# Patient Record
Sex: MALE | Race: BLACK OR AFRICAN AMERICAN | URBAN - METROPOLITAN AREA
[De-identification: names, ages, dates, MRNs, and addresses within clinical notes are randomized per-mention and may not be internally consistent; named-entity substitution may affect disease eponyms.]

---

## 2020-11-17 ENCOUNTER — EMERGENCY (EMERGENCY)
Facility: HOSPITAL | Age: 35
LOS: 0 days | Discharge: HOME | End: 2020-11-17
Attending: EMERGENCY MEDICINE | Admitting: EMERGENCY MEDICINE
Payer: COMMERCIAL

## 2020-11-17 VITALS
WEIGHT: 289.91 LBS | OXYGEN SATURATION: 97 % | RESPIRATION RATE: 18 BRPM | DIASTOLIC BLOOD PRESSURE: 88 MMHG | HEIGHT: 75 IN | SYSTOLIC BLOOD PRESSURE: 134 MMHG | HEART RATE: 97 BPM | TEMPERATURE: 99 F

## 2020-11-17 VITALS — HEIGHT: 75 IN

## 2020-11-17 DIAGNOSIS — U07.1 COVID-19: ICD-10-CM

## 2020-11-17 DIAGNOSIS — R11.0 NAUSEA: ICD-10-CM

## 2020-11-17 DIAGNOSIS — R73.9 HYPERGLYCEMIA, UNSPECIFIED: ICD-10-CM

## 2020-11-17 DIAGNOSIS — Z87.891 PERSONAL HISTORY OF NICOTINE DEPENDENCE: ICD-10-CM

## 2020-11-17 LAB
ALBUMIN SERPL ELPH-MCNC: 4.3 G/DL — SIGNIFICANT CHANGE UP (ref 3.5–5.2)
ALP SERPL-CCNC: 66 U/L — SIGNIFICANT CHANGE UP (ref 30–115)
ALT FLD-CCNC: 41 U/L — SIGNIFICANT CHANGE UP (ref 0–41)
ANION GAP SERPL CALC-SCNC: 13 MMOL/L — SIGNIFICANT CHANGE UP (ref 7–14)
AST SERPL-CCNC: 38 U/L — SIGNIFICANT CHANGE UP (ref 0–41)
BASE EXCESS BLDV CALC-SCNC: 2.8 MMOL/L — HIGH (ref -2–2)
BASOPHILS # BLD AUTO: 0.01 K/UL — SIGNIFICANT CHANGE UP (ref 0–0.2)
BASOPHILS NFR BLD AUTO: 0.2 % — SIGNIFICANT CHANGE UP (ref 0–1)
BILIRUB SERPL-MCNC: 0.6 MG/DL — SIGNIFICANT CHANGE UP (ref 0.2–1.2)
BUN SERPL-MCNC: 12 MG/DL — SIGNIFICANT CHANGE UP (ref 10–20)
CA-I SERPL-SCNC: 1.06 MMOL/L — LOW (ref 1.12–1.3)
CALCIUM SERPL-MCNC: 8.8 MG/DL — SIGNIFICANT CHANGE UP (ref 8.5–10.1)
CHLORIDE SERPL-SCNC: 91 MMOL/L — LOW (ref 98–110)
CO2 SERPL-SCNC: 25 MMOL/L — SIGNIFICANT CHANGE UP (ref 17–32)
CREAT SERPL-MCNC: 1.1 MG/DL — SIGNIFICANT CHANGE UP (ref 0.7–1.5)
EOSINOPHIL # BLD AUTO: 0 K/UL — SIGNIFICANT CHANGE UP (ref 0–0.7)
EOSINOPHIL NFR BLD AUTO: 0 % — SIGNIFICANT CHANGE UP (ref 0–8)
GAS PNL BLDV: 134 MMOL/L — LOW (ref 136–145)
GAS PNL BLDV: SIGNIFICANT CHANGE UP
GLUCOSE SERPL-MCNC: 366 MG/DL — HIGH (ref 70–99)
HCO3 BLDV-SCNC: 29 MMOL/L — SIGNIFICANT CHANGE UP (ref 22–29)
HCT VFR BLD CALC: 42.4 % — SIGNIFICANT CHANGE UP (ref 42–52)
HCT VFR BLDA CALC: 41.8 % — SIGNIFICANT CHANGE UP (ref 34–44)
HGB BLD CALC-MCNC: 13.6 G/DL — LOW (ref 14–18)
HGB BLD-MCNC: 14.5 G/DL — SIGNIFICANT CHANGE UP (ref 14–18)
IMM GRANULOCYTES NFR BLD AUTO: 0.5 % — HIGH (ref 0.1–0.3)
LACTATE BLDV-MCNC: 1.7 MMOL/L — HIGH (ref 0.5–1.6)
LYMPHOCYTES # BLD AUTO: 1.16 K/UL — LOW (ref 1.2–3.4)
LYMPHOCYTES # BLD AUTO: 19.7 % — LOW (ref 20.5–51.1)
MCHC RBC-ENTMCNC: 27.6 PG — SIGNIFICANT CHANGE UP (ref 27–31)
MCHC RBC-ENTMCNC: 34.2 G/DL — SIGNIFICANT CHANGE UP (ref 32–37)
MCV RBC AUTO: 80.8 FL — SIGNIFICANT CHANGE UP (ref 80–94)
MONOCYTES # BLD AUTO: 0.22 K/UL — SIGNIFICANT CHANGE UP (ref 0.1–0.6)
MONOCYTES NFR BLD AUTO: 3.7 % — SIGNIFICANT CHANGE UP (ref 1.7–9.3)
NEUTROPHILS # BLD AUTO: 4.47 K/UL — SIGNIFICANT CHANGE UP (ref 1.4–6.5)
NEUTROPHILS NFR BLD AUTO: 75.9 % — HIGH (ref 42.2–75.2)
NRBC # BLD: 0 /100 WBCS — SIGNIFICANT CHANGE UP (ref 0–0)
PCO2 BLDV: 50 MMHG — SIGNIFICANT CHANGE UP (ref 41–51)
PH BLDV: 7.37 — SIGNIFICANT CHANGE UP (ref 7.26–7.43)
PLATELET # BLD AUTO: 196 K/UL — SIGNIFICANT CHANGE UP (ref 130–400)
PO2 BLDV: 22 MMHG — SIGNIFICANT CHANGE UP (ref 20–40)
POTASSIUM BLDV-SCNC: 4.2 MMOL/L — SIGNIFICANT CHANGE UP (ref 3.3–5.6)
POTASSIUM SERPL-MCNC: 4.8 MMOL/L — SIGNIFICANT CHANGE UP (ref 3.5–5)
POTASSIUM SERPL-SCNC: 4.8 MMOL/L — SIGNIFICANT CHANGE UP (ref 3.5–5)
PROT SERPL-MCNC: 6.8 G/DL — SIGNIFICANT CHANGE UP (ref 6–8)
RBC # BLD: 5.25 M/UL — SIGNIFICANT CHANGE UP (ref 4.7–6.1)
RBC # FLD: 11.9 % — SIGNIFICANT CHANGE UP (ref 11.5–14.5)
SAO2 % BLDV: 29 % — SIGNIFICANT CHANGE UP
SODIUM SERPL-SCNC: 129 MMOL/L — LOW (ref 135–146)
WBC # BLD: 5.89 K/UL — SIGNIFICANT CHANGE UP (ref 4.8–10.8)
WBC # FLD AUTO: 5.89 K/UL — SIGNIFICANT CHANGE UP (ref 4.8–10.8)

## 2020-11-17 PROCEDURE — 93010 ELECTROCARDIOGRAM REPORT: CPT

## 2020-11-17 PROCEDURE — 71045 X-RAY EXAM CHEST 1 VIEW: CPT | Mod: 26

## 2020-11-17 PROCEDURE — 99285 EMERGENCY DEPT VISIT HI MDM: CPT

## 2020-11-17 RX ORDER — METFORMIN HYDROCHLORIDE 850 MG/1
1 TABLET ORAL
Qty: 28 | Refills: 0
Start: 2020-11-17 | End: 2020-11-30

## 2020-11-17 RX ORDER — SODIUM CHLORIDE 9 MG/ML
1000 INJECTION INTRAMUSCULAR; INTRAVENOUS; SUBCUTANEOUS ONCE
Refills: 0 | Status: COMPLETED | OUTPATIENT
Start: 2020-11-17 | End: 2020-11-17

## 2020-11-17 RX ADMIN — SODIUM CHLORIDE 1000 MILLILITER(S): 9 INJECTION INTRAMUSCULAR; INTRAVENOUS; SUBCUTANEOUS at 19:00

## 2020-11-17 NOTE — ED PROVIDER NOTE - OBJECTIVE STATEMENT
35 year old M no pmhx, recently tested covid + x 1 week ago presenting for eval. Pt sts for the last 10 days has had gen weakness, fatigue, cough, sob, decreased appetite, loss of smell. Sts wife also tested covid +. Pt developed nausea/ 3 episodes of nbnb vomiting today. No chest pain, abd pain, urinary symptoms, diarrhea, leg pain/swelling, recent travel.

## 2020-11-17 NOTE — ED ADULT NURSE NOTE - NSIMPLEMENTINTERV_GEN_ALL_ED
Implemented All Universal Safety Interventions:  [place] to call system. Call bell, personal items and telephone within reach. Instruct patient to call for assistance. Room bathroom lighting operational. Non-slip footwear when patient is off stretcher. Physically safe environment: no spills, clutter or unnecessary equipment. Stretcher in lowest position, wheels locked, appropriate side rails in place.

## 2020-11-17 NOTE — ED PROVIDER NOTE - PATIENT PORTAL LINK FT
You can access the FollowMyHealth Patient Portal offered by [facility] by registering at the following website: [email or web address]. By joining [facility]’s FollowMyHealth portal, you will also be able to view your health information using other applications (apps) compatible with our system.

## 2020-11-17 NOTE — ED ADULT TRIAGE NOTE - CHIEF COMPLAINT QUOTE
patient tested positive for covid 11/10. patient presents to the er complaining of weakness, nausea and vomiting, had 2-3 episodes of nonbloody emesis.

## 2020-11-17 NOTE — ED PROVIDER NOTE - PROGRESS NOTE DETAILS
Glucose is elevated, patient does not have a h/o DM, blood gas was ordered. Patient appears well, oxygen saturation WNL, nml work od breathing.  No acidosis on blood gas.  Test results were d/w the patient, he now admitted that he had DM in the past, used to take Metformin and Glipizide then lost weight, changed his diet and was taken off his meds.  Will prescribe Metformin again, patient was advised to follow up with his PMD as soon as he recovers from COVID.  Advised to return to ER immediately  if any worsening symptoms.  Patient to be discharged from ED. Any available test results were discussed with patient and/or family. Verbal instructions given, including instructions to return to ED immediately for any new, worsening, or concerning symptoms. Patient endorsed understanding. Written discharge instructions additionally given, including follow-up plan.  Patient was given opportunity to ask questions.

## 2020-11-17 NOTE — ED ADULT NURSE NOTE - CHIEF COMPLAINT QUOTE
patient tested positive for covid 1110. patient presents to the er complaining of weakness, nausea and vomiting, had 2-3 episodes of nonbloody emesis.

## 2020-11-17 NOTE — ED PROVIDER NOTE - NS ED ROS FT
Constitutional: + chills, gen weakness, fatigue  Eyes: no redness/discharge/pain/vision changes  ENT: no rhinorrhea/ear pain/sore throat  Cardiac: No chest pain  Respiratory: + cough/sob. No respiratory distress  GI: + n/v. No  diarrhea or abdominal pain.  : No dysuria, frequency, urgency or hematuria  MS: no pain to back or extremities, no loss of ROM, no weakness  Neuro: No headache or weakness. No LOC.  Skin: No skin rash.  Endocrine: No history of thyroid disease or diabetes.  Except as documented in the HPI, all other systems are negative.

## 2020-11-17 NOTE — ED PROVIDER NOTE - CARE PROVIDER_API CALL
Margaret Will  INTERNAL MEDICINE  [address] [place] [id number]  Phone: [phone]  Fax: [phone]  Follow Up Time:

## 2020-11-17 NOTE — ED PROVIDER NOTE - CLINICAL SUMMARY MEDICAL DECISION MAKING FREE TEXT BOX
Patient appears well, oxygen saturation WNL, nml work od breathing.  No acidosis on blood gas.  Test results were d/w the patient, he now admitted that he had DM in the past, used to take Metformin and Glipizide then lost weight, changed his diet and was taken off his meds.  Will prescribe Metformin again, patient was advised to follow up with his PMD as soon as he recovers from COVID.  Advised to return to ER immediately  if any worsening symptoms.

## 2020-11-17 NOTE — ED PROVIDER NOTE - CARE PROVIDERS DIRECT ADDRESSES
[email or web address] [address].[email or web address] Islandirect.[facility]

## 2020-11-17 NOTE — ED PROVIDER NOTE - ATTENDING CONTRIBUTION TO CARE
36 yo male without any significant PMH, tested positive for COVID -19 last week, symptomatic for past 10 days c/o vomiting which developed today, associated with poor appetite, diminished sense of smell, non-productive cough and feeling of SOB, generalized fatigue.  + COVID contacts at home.  No diarrhea or abdominal pain. no leg pain or swelling.   Well-appearing well-nourished, NAD, head AT/NC, PERRL, pink conjunctivae, nml phonation, supple neck without midline spine ttp, nml work of breathing,  speaking full sentences, lungs CTA b/l, equal air entry, RRR, well-perfused extremities, distal pulses intact, abdomen soft, NT/ND, BS present in all quadrants, no midline spine or CVA ttp, no leg edema or unilateral calf swelling, A&Ox3, no focal neuro deficits, nml mood and affect.  He is carrying a conversation while ambulating , pulse ox did not drop below 96 % on RA during activity and he did not report worsening of shortness of breath.  Will give fluids, get CXR and ECG, likely d/c home.  Patient has a pulse ox at home already.

## 2020-11-17 NOTE — ED PROVIDER NOTE - NSTIMEPROVIDERCAREINITIATE_GEN_ER
Pt is given results of thyroid US.  Per Dr Lema, nodules are common, not suspicious for cancer.  Pt should have repeat US in one year.   17-Nov-2020 18:08

## 2021-09-01 PROBLEM — Z00.00 ENCOUNTER FOR PREVENTIVE HEALTH EXAMINATION: Status: ACTIVE | Noted: 2021-09-01

## 2021-09-07 DIAGNOSIS — E66.01 MORBID (SEVERE) OBESITY DUE TO EXCESS CALORIES: ICD-10-CM

## 2021-09-09 ENCOUNTER — APPOINTMENT (OUTPATIENT)
Dept: SURGERY | Facility: CLINIC | Age: 36
End: 2021-09-09

## 2021-10-13 ENCOUNTER — APPOINTMENT (OUTPATIENT)
Age: 36
End: 2021-10-13
Payer: COMMERCIAL

## 2021-10-13 VITALS
WEIGHT: 301 LBS | HEART RATE: 80 BPM | DIASTOLIC BLOOD PRESSURE: 82 MMHG | SYSTOLIC BLOOD PRESSURE: 150 MMHG | BODY MASS INDEX: 36.65 KG/M2 | OXYGEN SATURATION: 98 % | HEIGHT: 76 IN

## 2021-10-13 PROCEDURE — 99202 OFFICE O/P NEW SF 15 MIN: CPT

## 2021-10-13 NOTE — HISTORY OF PRESENT ILLNESS
[Initial Evaluation] : an initial evaluation of [Snoring] : snoring [Unrefreshing Sleep] : unrefreshing sleep [Currently Experiencing] : The patient is currently experiencing symptoms. [None] : No associated symptoms are reported

## 2021-11-06 ENCOUNTER — OUTPATIENT (OUTPATIENT)
Dept: OUTPATIENT SERVICES | Facility: HOSPITAL | Age: 36
LOS: 1 days | Discharge: HOME | End: 2021-11-06
Payer: COMMERCIAL

## 2021-11-06 PROCEDURE — 95810 POLYSOM 6/> YRS 4/> PARAM: CPT | Mod: 26

## 2021-11-08 DIAGNOSIS — G47.33 OBSTRUCTIVE SLEEP APNEA (ADULT) (PEDIATRIC): ICD-10-CM

## 2021-11-21 ENCOUNTER — LABORATORY RESULT (OUTPATIENT)
Age: 36
End: 2021-11-21

## 2021-11-24 ENCOUNTER — OUTPATIENT (OUTPATIENT)
Dept: OUTPATIENT SERVICES | Facility: HOSPITAL | Age: 36
LOS: 1 days | Discharge: HOME | End: 2021-11-24
Payer: COMMERCIAL

## 2021-11-24 DIAGNOSIS — R06.02 SHORTNESS OF BREATH: ICD-10-CM

## 2021-11-24 PROCEDURE — 94729 DIFFUSING CAPACITY: CPT | Mod: 26

## 2021-11-24 PROCEDURE — 94060 EVALUATION OF WHEEZING: CPT | Mod: 26

## 2021-11-24 PROCEDURE — 94727 GAS DIL/WSHOT DETER LNG VOL: CPT | Mod: 26

## 2021-11-29 ENCOUNTER — APPOINTMENT (OUTPATIENT)
Age: 36
End: 2021-11-29
Payer: COMMERCIAL

## 2021-11-29 VITALS
OXYGEN SATURATION: 95 % | WEIGHT: 296 LBS | DIASTOLIC BLOOD PRESSURE: 88 MMHG | BODY MASS INDEX: 36.04 KG/M2 | HEIGHT: 76 IN | HEART RATE: 115 BPM | RESPIRATION RATE: 14 BRPM | SYSTOLIC BLOOD PRESSURE: 140 MMHG

## 2021-11-29 PROCEDURE — 99214 OFFICE O/P EST MOD 30 MIN: CPT | Mod: 25

## 2021-11-29 PROCEDURE — 71046 X-RAY EXAM CHEST 2 VIEWS: CPT

## 2021-11-29 NOTE — DISCUSSION/SUMMARY
[FreeTextEntry1] : CXR PA and Lateral \par The costophrenic and cardiophrenic angles are sharp\par The srikanth parenchyma shows no infiltrates, consolidations, or nodules \par The Mediastinum is within normal limits\par No pleural effusions\par

## 2021-11-29 NOTE — HISTORY OF PRESENT ILLNESS
[Follow-Up - Routine Clinic] : a routine clinic follow-up of [Snoring] : snoring [Unrefreshing Sleep] : unrefreshing sleep [Currently Experiencing] : The patient is currently experiencing symptoms. [None] : No associated symptoms are reported

## 2022-03-10 ENCOUNTER — APPOINTMENT (OUTPATIENT)
Age: 37
End: 2022-03-10
Payer: COMMERCIAL

## 2022-03-10 VITALS
WEIGHT: 297 LBS | RESPIRATION RATE: 14 BRPM | OXYGEN SATURATION: 97 % | SYSTOLIC BLOOD PRESSURE: 146 MMHG | HEART RATE: 112 BPM | HEIGHT: 76 IN | BODY MASS INDEX: 36.17 KG/M2 | DIASTOLIC BLOOD PRESSURE: 86 MMHG

## 2022-03-10 DIAGNOSIS — G47.33 OBSTRUCTIVE SLEEP APNEA (ADULT) (PEDIATRIC): ICD-10-CM

## 2022-03-10 PROCEDURE — 99213 OFFICE O/P EST LOW 20 MIN: CPT

## 2022-03-10 NOTE — ASSESSMENT
[FreeTextEntry1] : MOderate DALE on APAP \par At the present time from the pulmonary stand point, the patient is stable for his bariatric surgery

## 2022-03-10 NOTE — HISTORY OF PRESENT ILLNESS
[Follow-Up - Routine Clinic] : a routine clinic follow-up of [None] : No associated symptoms are reported [Good Compliance] : good compliance with treatment [Good Tolerance] : good tolerance of treatment [Good Symptom Control] : good symptom control [de-identified] : APAP

## 2022-10-03 ENCOUNTER — APPOINTMENT (OUTPATIENT)
Age: 37
End: 2022-10-03

## 2024-03-27 ENCOUNTER — INPATIENT (INPATIENT)
Facility: HOSPITAL | Age: 39
LOS: 0 days | Discharge: HOME | DRG: 299 | End: 2024-03-28
Attending: INTERNAL MEDICINE | Admitting: STUDENT IN AN ORGANIZED HEALTH CARE EDUCATION/TRAINING PROGRAM
Payer: COMMERCIAL

## 2024-03-27 VITALS
RESPIRATION RATE: 17 BRPM | HEART RATE: 82 BPM | TEMPERATURE: 98 F | HEIGHT: 77 IN | OXYGEN SATURATION: 100 % | DIASTOLIC BLOOD PRESSURE: 89 MMHG | SYSTOLIC BLOOD PRESSURE: 168 MMHG | WEIGHT: 250 LBS

## 2024-03-27 DIAGNOSIS — I82.409 ACUTE EMBOLISM AND THROMBOSIS OF UNSPECIFIED DEEP VEINS OF UNSPECIFIED LOWER EXTREMITY: ICD-10-CM

## 2024-03-27 DIAGNOSIS — Z98.890 OTHER SPECIFIED POSTPROCEDURAL STATES: Chronic | ICD-10-CM

## 2024-03-27 DIAGNOSIS — I26.99 OTHER PULMONARY EMBOLISM WITHOUT ACUTE COR PULMONALE: ICD-10-CM

## 2024-03-27 LAB
ALBUMIN SERPL ELPH-MCNC: 4.6 G/DL — SIGNIFICANT CHANGE UP (ref 3.5–5.2)
ALP SERPL-CCNC: 112 U/L — SIGNIFICANT CHANGE UP (ref 30–115)
ALT FLD-CCNC: 21 U/L — SIGNIFICANT CHANGE UP (ref 0–41)
ANION GAP SERPL CALC-SCNC: 14 MMOL/L — SIGNIFICANT CHANGE UP (ref 7–14)
APTT BLD: 35.8 SEC — SIGNIFICANT CHANGE UP (ref 27–39.2)
AST SERPL-CCNC: 16 U/L — SIGNIFICANT CHANGE UP (ref 0–41)
BASOPHILS # BLD AUTO: 0.07 K/UL — SIGNIFICANT CHANGE UP (ref 0–0.2)
BASOPHILS NFR BLD AUTO: 0.9 % — SIGNIFICANT CHANGE UP (ref 0–1)
BILIRUB SERPL-MCNC: 0.5 MG/DL — SIGNIFICANT CHANGE UP (ref 0.2–1.2)
BUN SERPL-MCNC: 15 MG/DL — SIGNIFICANT CHANGE UP (ref 10–20)
CALCIUM SERPL-MCNC: 10.1 MG/DL — SIGNIFICANT CHANGE UP (ref 8.4–10.5)
CHLORIDE SERPL-SCNC: 94 MMOL/L — LOW (ref 98–110)
CHOLEST SERPL-MCNC: 189 MG/DL — SIGNIFICANT CHANGE UP
CO2 SERPL-SCNC: 25 MMOL/L — SIGNIFICANT CHANGE UP (ref 17–32)
CREAT SERPL-MCNC: 0.9 MG/DL — SIGNIFICANT CHANGE UP (ref 0.7–1.5)
EGFR: 111 ML/MIN/1.73M2 — SIGNIFICANT CHANGE UP
EOSINOPHIL # BLD AUTO: 0.11 K/UL — SIGNIFICANT CHANGE UP (ref 0–0.7)
EOSINOPHIL NFR BLD AUTO: 1.3 % — SIGNIFICANT CHANGE UP (ref 0–8)
GLUCOSE SERPL-MCNC: 293 MG/DL — HIGH (ref 70–99)
HCT VFR BLD CALC: 39.8 % — LOW (ref 42–52)
HDLC SERPL-MCNC: 35 MG/DL — LOW
HGB BLD-MCNC: 13.4 G/DL — LOW (ref 14–18)
IMM GRANULOCYTES NFR BLD AUTO: 0.5 % — HIGH (ref 0.1–0.3)
INR BLD: 0.97 RATIO — SIGNIFICANT CHANGE UP (ref 0.65–1.3)
LACTATE SERPL-SCNC: 2.4 MMOL/L — HIGH (ref 0.7–2)
LACTATE SERPL-SCNC: 3.1 MMOL/L — HIGH (ref 0.7–2)
LYMPHOCYTES # BLD AUTO: 2.1 K/UL — SIGNIFICANT CHANGE UP (ref 1.2–3.4)
LYMPHOCYTES # BLD AUTO: 25.5 % — SIGNIFICANT CHANGE UP (ref 20.5–51.1)
MCHC RBC-ENTMCNC: 25 PG — LOW (ref 27–31)
MCHC RBC-ENTMCNC: 33.7 G/DL — SIGNIFICANT CHANGE UP (ref 32–37)
MCV RBC AUTO: 74.3 FL — LOW (ref 80–94)
MONOCYTES # BLD AUTO: 0.49 K/UL — SIGNIFICANT CHANGE UP (ref 0.1–0.6)
MONOCYTES NFR BLD AUTO: 6 % — SIGNIFICANT CHANGE UP (ref 1.7–9.3)
NEUTROPHILS # BLD AUTO: 5.41 K/UL — SIGNIFICANT CHANGE UP (ref 1.4–6.5)
NEUTROPHILS NFR BLD AUTO: 65.8 % — SIGNIFICANT CHANGE UP (ref 42.2–75.2)
NON HDL CHOLESTEROL: 154 MG/DL — HIGH
NRBC # BLD: 0 /100 WBCS — SIGNIFICANT CHANGE UP (ref 0–0)
NT-PROBNP SERPL-SCNC: 59 PG/ML — SIGNIFICANT CHANGE UP (ref 0–300)
PLATELET # BLD AUTO: 329 K/UL — SIGNIFICANT CHANGE UP (ref 130–400)
PMV BLD: 9.1 FL — SIGNIFICANT CHANGE UP (ref 7.4–10.4)
POTASSIUM SERPL-MCNC: 4.3 MMOL/L — SIGNIFICANT CHANGE UP (ref 3.5–5)
POTASSIUM SERPL-SCNC: 4.3 MMOL/L — SIGNIFICANT CHANGE UP (ref 3.5–5)
PROT SERPL-MCNC: 7.7 G/DL — SIGNIFICANT CHANGE UP (ref 6–8)
PROTHROM AB SERPL-ACNC: 11.1 SEC — SIGNIFICANT CHANGE UP (ref 9.95–12.87)
RBC # BLD: 5.36 M/UL — SIGNIFICANT CHANGE UP (ref 4.7–6.1)
RBC # FLD: 12.9 % — SIGNIFICANT CHANGE UP (ref 11.5–14.5)
SODIUM SERPL-SCNC: 133 MMOL/L — LOW (ref 135–146)
TRIGL SERPL-MCNC: 458 MG/DL — HIGH
TROPONIN T, HIGH SENSITIVITY RESULT: 12 NG/L — SIGNIFICANT CHANGE UP (ref 6–21)
TROPONIN T, HIGH SENSITIVITY RESULT: 14 NG/L — SIGNIFICANT CHANGE UP (ref 6–21)
WBC # BLD: 8.22 K/UL — SIGNIFICANT CHANGE UP (ref 4.8–10.8)
WBC # FLD AUTO: 8.22 K/UL — SIGNIFICANT CHANGE UP (ref 4.8–10.8)

## 2024-03-27 PROCEDURE — 99223 1ST HOSP IP/OBS HIGH 75: CPT

## 2024-03-27 PROCEDURE — 93306 TTE W/DOPPLER COMPLETE: CPT

## 2024-03-27 PROCEDURE — 85027 COMPLETE CBC AUTOMATED: CPT

## 2024-03-27 PROCEDURE — 80061 LIPID PANEL: CPT

## 2024-03-27 PROCEDURE — 86901 BLOOD TYPING SEROLOGIC RH(D): CPT

## 2024-03-27 PROCEDURE — 86900 BLOOD TYPING SEROLOGIC ABO: CPT

## 2024-03-27 PROCEDURE — 36415 COLL VENOUS BLD VENIPUNCTURE: CPT

## 2024-03-27 PROCEDURE — 83605 ASSAY OF LACTIC ACID: CPT

## 2024-03-27 PROCEDURE — 99285 EMERGENCY DEPT VISIT HI MDM: CPT

## 2024-03-27 PROCEDURE — 86850 RBC ANTIBODY SCREEN: CPT

## 2024-03-27 PROCEDURE — 83880 ASSAY OF NATRIURETIC PEPTIDE: CPT

## 2024-03-27 PROCEDURE — 93010 ELECTROCARDIOGRAM REPORT: CPT

## 2024-03-27 PROCEDURE — 80053 COMPREHEN METABOLIC PANEL: CPT

## 2024-03-27 PROCEDURE — 84484 ASSAY OF TROPONIN QUANT: CPT

## 2024-03-27 PROCEDURE — 83721 ASSAY OF BLOOD LIPOPROTEIN: CPT

## 2024-03-27 PROCEDURE — 93970 EXTREMITY STUDY: CPT | Mod: 26

## 2024-03-27 PROCEDURE — 83036 HEMOGLOBIN GLYCOSYLATED A1C: CPT

## 2024-03-27 PROCEDURE — 71275 CT ANGIOGRAPHY CHEST: CPT | Mod: 26,MC

## 2024-03-27 PROCEDURE — 83735 ASSAY OF MAGNESIUM: CPT

## 2024-03-27 PROCEDURE — 84443 ASSAY THYROID STIM HORMONE: CPT

## 2024-03-27 RX ORDER — MORPHINE SULFATE 50 MG/1
2 CAPSULE, EXTENDED RELEASE ORAL ONCE
Refills: 0 | Status: DISCONTINUED | OUTPATIENT
Start: 2024-03-27 | End: 2024-03-27

## 2024-03-27 RX ORDER — ENOXAPARIN SODIUM 100 MG/ML
110 INJECTION SUBCUTANEOUS ONCE
Refills: 0 | Status: COMPLETED | OUTPATIENT
Start: 2024-03-27 | End: 2024-03-27

## 2024-03-27 RX ORDER — APIXABAN 2.5 MG/1
10 TABLET, FILM COATED ORAL EVERY 12 HOURS
Refills: 0 | Status: DISCONTINUED | OUTPATIENT
Start: 2024-03-27 | End: 2024-03-27

## 2024-03-27 RX ORDER — APIXABAN 2.5 MG/1
10 TABLET, FILM COATED ORAL EVERY 12 HOURS
Refills: 0 | Status: DISCONTINUED | OUTPATIENT
Start: 2024-03-27 | End: 2024-03-28

## 2024-03-27 RX ORDER — SODIUM CHLORIDE 9 MG/ML
1000 INJECTION, SOLUTION INTRAVENOUS ONCE
Refills: 0 | Status: COMPLETED | OUTPATIENT
Start: 2024-03-27 | End: 2024-03-27

## 2024-03-27 RX ADMIN — SODIUM CHLORIDE 1000 MILLILITER(S): 9 INJECTION, SOLUTION INTRAVENOUS at 19:55

## 2024-03-27 RX ADMIN — APIXABAN 10 MILLIGRAM(S): 2.5 TABLET, FILM COATED ORAL at 21:41

## 2024-03-27 RX ADMIN — ENOXAPARIN SODIUM 110 MILLIGRAM(S): 100 INJECTION SUBCUTANEOUS at 15:43

## 2024-03-27 NOTE — H&P ADULT - NSHPREVIEWOFSYSTEMS_GEN_ALL_CORE
CONSTITUTIONAL: Well-developed; well-nourished; in no acute distress.   SKIN: warm, dry  HEAD: Normocephalic; atraumatic.  EYES: PERRL, EOMI, no conjunctival erythema  ENT: No nasal discharge; airway clear.  NECK: Supple; non tender.  CARD: S1, S2 normal; no murmurs, gallops, or rubs. Regular rate and rhythm.   RESP: No wheezes, rales or rhonchi.  ABD: soft ntnd  EXT: Normal ROM. LLE is edematous >RLE. DP pulse 2+ to the LEs. tenderness to the left calf- no bony tenderness.   NEURO: Alert, oriented, grossly unremarkable.  PSYCH: Cooperative, appropriate.

## 2024-03-27 NOTE — H&P ADULT - NSHPPHYSICALEXAM_GEN_ALL_CORE
General: NAD, Lying in bed comfortably  Neuro: AAOx3  HEENT: PERRL, EOMI  Cardio: Regular rate   Resp: Breathing comfortably on room air, no signs of respiratory distress; equal chest rise bilaterally   GI/Abd: Soft, NT/ND, no rebound/guarding.  Vascular: +TTP in the left calf, mild swelling of LLE; otherwise normal exam   Skin: Intact, no breakdown  Musculoskeletal: All 4 extremities moving spontaneously, no limitations.

## 2024-03-27 NOTE — CONSULT NOTE ADULT - ASSESSMENT
38M w/ PMHx of DM, x2 herniated discs s/p back surgery in 9/2023, here for LLE calf pain. Venous duplex reporting extensive DVT of the LLE.     #Extensive DVT of left lower extremity (femoral, popliteal, gastrocnemius, posterior tibial, peroneal, anterior tibial v.)   - No acute vascular intervention indicated   - Give shot of Lovenox in ED   - D/C home w/ Eliquis 10mg BID x 7 days, then Eliquis 5mg BID   - F/u outpatient with Dr. Chacon in 1 week   - Needs LLE compression dressings w/ Ace wrap, advised patient to start wearing compression stockings     Plan discussed with attending, Dr. Chacon   ------------------------------  Vascular Surgery  [phone]  38M w/ PMHx of DM, x2 herniated discs s/p back surgery in 9/2023, here for LLE calf pain. Venous duplex reporting extensive DVT of the LLE.     #Extensive DVT of left lower extremity (femoral, popliteal, gastrocnemius, posterior tibial, peroneal, anterior tibial v.)   - No acute vascular intervention indicated   - Give shot of therapeutic Lovenox in ED   - D/C home w/ Eliquis 10mg BID x 7 days, then Eliquis 5mg BID   - F/u outpatient with Dr. Chacon in 1 week   - Needs LLE compression dressings w/ Ace wrap, advised patient to start wearing compression stockings     Plan discussed with attending, Dr. Chacon   ------------------------------  Vascular Surgery  [phone]

## 2024-03-27 NOTE — ED ADULT NURSE NOTE - NSFALLUNIVINTERV_ED_ALL_ED
Bed/Stretcher in lowest position, wheels locked, appropriate side rails in place/Call bell, personal items and telephone in reach/Instruct patient to call for assistance before getting out of bed/chair/stretcher/Non-slip footwear applied when patient is off stretcher/[place] to call system/Physically safe environment - no spills, clutter or unnecessary equipment/Purposeful proactive rounding/Room/bathroom lighting operational, light cord in reach

## 2024-03-27 NOTE — CONSULT NOTE ADULT - SUBJECTIVE AND OBJECTIVE BOX
Patient is a 38y old  Male who presents with a chief complaint of LLE calf pain.     HPI: 38M w/ PMHx of DM, recent back surgery for x2 herniated discs in 9/2023, who presents to [facility] with his wife complaining of LLE pain. According to the patient, he began experiencing severe calf pain about 2 weeks ago. He went to his pain management doctor who advised the patient to come to ED for a duplex scan. 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 Denies fevers/chills, denies lightheadedness/dizziness, denies SOB/chest pain, denies nausea/vomiting, denies constipation/diarrhea.      ROS: 10-system review is otherwise negative except HPI above.      PAST MEDICAL & SURGICAL HISTORY:  ·	History of back surgery  ·	Diabetes Mellitus      FAMILY HISTORY:     SOCIAL HISTORY:  non-smoker     ALLERGIES: No Known Allergies    HOME MEDICATIONS:      CURRENT MEDICATIONS  MEDICATIONS (STANDING):   MEDICATIONS (PRN):  --------------------------------------------------------------------------------------------  VITALS:   T(C): 36.8 (03-27-24 @ 10:17), Max: 36.8 (03-27-24 @ 10:17)  HR: 82 (03-27-24 @ 10:17) (82 - 82)  BP: 168/89 (03-27-24 @ 10:17) (168/89 - 168/89)  RR: 17 (03-27-24 @ 10:17) (17 - 17)  SpO2: 100% (03-27-24 @ 10:17) (100% - 100%)    Height (cm): 195.6 (03-27 @ 10:17)  Weight (kg): 113.4 (03-27 @ 10:17)  BMI (kg/m2): 29.6 (03-27 @ 10:17)  BSA (m2): 2.46 (03-27 @ 10:17)    PHYSICAL EXAM:   General: NAD, Lying in bed comfortably  Neuro: AAOx3  HEENT: PERRL, EOMI  Cardio: Regular rate   Resp: Breathing comfortably on room air, no signs of respiratory distress; equal chest rise bilaterally   GI/Abd: Soft, NT/ND, no rebound/guarding.  Vascular: +TTP in the left calf, mild swelling of LLE; otherwise normal exam   Skin: Intact, no breakdown  Musculoskeletal: All 4 extremities moving spontaneously, no limitations.   --------------------------------------------------------------------------------------------  LABS    --------------------------------------------------------------------------------------------  MICROBIOLOGY    --------------------------------------------------------------------------------------------  IMAGING  --> VA Duplex Lower Ext Vein Scan, Bilat (03.27.24 @ 11:15) >  IMPRESSION:  Right lower extremity negative for DVT.    DVT in the left femoral, popliteal, gastrocnemius, posterior tibial,   peroneal, and anterior tibial veins.    < end of copied text >   Patient is a 38y old  Male who presents with a chief complaint of LLE calf pain.     HPI: 38M w/ PMHx of DM, recent back surgery for x2 herniated discs in 9/2023, who presents to [facility] with his wife complaining of LLE pain. According to the patient, he began experiencing severe calf pain about 2 weeks ago. He went to his pain management doctor who advised the patient to come to ED for a duplex scan. 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 Denies fevers/chills, denies lightheadedness/dizziness, denies SOB/chest pain, denies nausea/vomiting, denies constipation/diarrhea.      ROS: 10-system review is otherwise negative except HPI above.      PAST MEDICAL & SURGICAL HISTORY:  ·	History of back surgery  ·	Diabetes Mellitus    FAMILY HISTORY:     SOCIAL HISTORY:  non-smoker     ALLERGIES: No Known Allergies    HOME MEDICATIONS:      CURRENT MEDICATIONS  MEDICATIONS (STANDING):   MEDICATIONS (PRN):  --------------------------------------------------------------------------------------------  VITALS:   T(C): 36.8 (03-27-24 @ 10:17), Max: 36.8 (03-27-24 @ 10:17)  HR: 82 (03-27-24 @ 10:17) (82 - 82)  BP: 168/89 (03-27-24 @ 10:17) (168/89 - 168/89)  RR: 17 (03-27-24 @ 10:17) (17 - 17)  SpO2: 100% (03-27-24 @ 10:17) (100% - 100%)    Height (cm): 195.6 (03-27 @ 10:17)  Weight (kg): 113.4 (03-27 @ 10:17)  BMI (kg/m2): 29.6 (03-27 @ 10:17)  BSA (m2): 2.46 (03-27 @ 10:17)    PHYSICAL EXAM:   General: NAD, Lying in bed comfortably  Neuro: AAOx3  HEENT: PERRL, EOMI  Cardio: Regular rate   Resp: Breathing comfortably on room air, no signs of respiratory distress; equal chest rise bilaterally   GI/Abd: Soft, NT/ND, no rebound/guarding.  Vascular: +TTP in the left calf, mild swelling of LLE; otherwise normal exam   Skin: Intact, no breakdown  Musculoskeletal: All 4 extremities moving spontaneously, no limitations.   --------------------------------------------------------------------------------------------  LABS    --------------------------------------------------------------------------------------------  MICROBIOLOGY    --------------------------------------------------------------------------------------------  IMAGING  --> VA Duplex Lower Ext Vein Scan, Bilat (03.27.24 @ 11:15) >  IMPRESSION:  Right lower extremity negative for DVT.    DVT in the left femoral, popliteal, gastrocnemius, posterior tibial,   peroneal, and anterior tibial veins.    < end of copied text >

## 2024-03-27 NOTE — H&P ADULT - NSHPLABSRESULTS_GEN_ALL_CORE
The patient has been notified via their active [facility] account.     13.4   8.22  )-----------( [phone]      ( 27 Mar 2024 11:38 )             39.8     03-27    133<L>  |  94<L>  |  15  ----------------------------<  293<H>  4.3   |  25  |  0.9    Ca    10.1      27 Mar 2024 11:38    TPro  7.7  /  Alb  4.6  /  TBili  0.5  /  DBili  x   /  AST  16  /  ALT  21  /  AlkPhos  112  03-27    PT/INR - ( 27 Mar 2024 11:38 )   PT: 11.10 sec;   INR: 0.97 ratio         PTT - ( 27 Mar 2024 11:38 )  PTT:35.8 sec  Urinalysis Basic - ( 27 Mar 2024 11:38 )    Color: x / Appearance: x / SG: x / pH: x  Gluc: 293 mg/dL / Ketone: x  / Bili: x / Urobili: x   Blood: x / Protein: x / Nitrite: x   Leuk Esterase: x / RBC: x / WBC x   Sq Epi: x / Non Sq Epi: x / Bacteria: x        Lactate, Blood: 2.4 mmol/L *H* (03-27-24 @ 15:49)

## 2024-03-27 NOTE — ED PROVIDER NOTE - CLINICAL SUMMARY MEDICAL DECISION MAKING FREE TEXT BOX
38-year-old male with history of fusion surgery in September following with pain management, diabetes presenting today for displaceable lower extremity.  Patient states he has had left lower extremity swelling and pain for the last 2 weeks. Labs unremarkable. extensive DVT on US, vascular surgery consult appreciated. Patient noted with tachycardia, underwent CT angio chest with PE. Started on anticoagulation. admitted for further management.

## 2024-03-27 NOTE — H&P ADULT - HISTORY OF PRESENT ILLNESS
38M w/ PMHx of DM, recent back surgery for x2 herniated discs in 2023, who presents to [facility] with his wife complaining of LLE pain. According to the patient, he began experiencing severe calf pain about 2 weeks ago. He went to his pain management doctor who advised the patient to come to ED for a duplex scan. 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 Denies fevers/chills, denies lightheadedness/dizziness, denies SOB/chest pain, denies nausea/vomiting, denies constipation/diarrhea.      In the ER:   VS: /89, HR 82, RR 17, T 36.8 oral, Spo2 100% RA  Sig Labs: Trop 12, Lactate 2.1, BNP 59    EKG: Sinus rhythm with short MT Inferior infarct , age undetermined  Imagin) Duplex LE --> Right lower extremity negative for DVT. DVT in the left femoral, popliteal, gastrocnemius, posterior tibial, peroneal, and anterior tibial veins.  2) CTA Chest -->  Findings positive for pulmonary emboli within the segmental left lower arterial branch and subsegmental left upper lobe arterial branch.    Interventions in the ER: Lovenox 110

## 2024-03-27 NOTE — ED PROVIDER NOTE - OBJECTIVE STATEMENT
38-year-old male history of back surgery in September, diabetes complaining of left calf pain and swelling x 2 weeks.  He went to his pain management doctor  who noticed that his leg was swollen so sent him for duplex to rule out DVT.  Patient denies any recent travel

## 2024-03-27 NOTE — ED PROVIDER NOTE - PROGRESS NOTE DETAILS
vascular tech Laly--> Dr Ballesteros, + extensive dvt  to left leg  Dr Ballesteros --> vascular surgery, will come see patient DC: vas saw patient, recommends dose of lovenox in ED and eliquis if discharged. given how extensive clot is, will perform CT angio to r/o embolism. patient is stable. +PE on CT. Will admit. FELICIA [place] will f/u pending lab results

## 2024-03-27 NOTE — H&P ADULT - ATTENDING COMMENTS
38M w/ PMHx of DM, x2 herniated discs s/p back surgery in 9/2023, here for LLE calf pain. Venous duplex reporting extensive DVT of the LLE. CTA Chest showing segmental left lower arterial branch and subsegmental left upper lobe arterial branch.    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 Chacon in 1 week   - DC in 24 hours     # DM   - Patient says he was on metformin but PCP told him to stop it     # DVT prophylaxis - Eliquis 10 BID   # GI prophylaxis - Not Indicated    # Diet - Regular (Change to CC if A1C elevated)    # Activity Score (AM-PAC) - AAT  # Code status - Full   # Disposition - Home in 24 hrs

## 2024-03-27 NOTE — H&P ADULT - ASSESSMENT
38M w/ PMHx of DM, x2 herniated discs s/p back surgery in 9/2023, here for LLE calf pain. Venous duplex reporting extensive DVT of the LLE. CTA Chest showing segmental left lower arterial branch and subsegmental left upper lobe arterial branch.    #Extensive DVT of left lower extremity (femoral, popliteal, gastrocnemius, posterior tibial, peroneal, anterior tibial v.)   #PE of segmental left lower arterial branch and subsegmental left upper lobe arterial branch  #Provoked DVT in setting of back surgery and 1 month of immobility (desk-duty as patient describes)   #Low Risk PE   - Patient clinically and HD stable on admission (on room air satting 100%)   - No EKG changes consistent with intermediate or high risk PE   - No RHS on CTA or POCUS  - Give shot of therapeutic Lovenox in ED   - No acute vascular intervention indicated per vascular   - Will start eliquis 10mg BID for 7 days followed by 5mg BID for 3-6 months   - F/u outpatient with Dr. Chacon in 1 week   - Needs LLE compression dressings w/ Ace wrap, advised patient to start wearing compression stockings   - Repeat one more set of trop and lactate (after 1 L bolus)    - Percocet PRN for Pain   - PT consult   - DC in 24 hours     # DM   - Patient says he was on metformin but PCP told him to stop it   - Send A1C   - Start ISS if sugars are > 180     # DVT prophylaxis - Eliquis 10 BID   # GI prophylaxis - Not Indicated    # Diet - Regular (Change to CC if A1C elevated)    # Activity Score (AM-PAC) - AAT  # Code status - Full   # Disposition - Home in 24 hrs

## 2024-03-27 NOTE — ED PROVIDER NOTE - ATTENDING APP SHARED VISIT CONTRIBUTION OF CARE
38-year-old male with history of fusion surgery in September following with pain management, diabetes presenting today for displaceable lower extremity.  Patient states he has had left lower extremity swelling and pain for the last 2 weeks.  Denies chest pain or shortness of breath.  Denies history of DVT.  Denies any medical history.  On exam patient noted to have edema to left lower extremity extending to mid thigh, otherwise intact otherwise.  Tenderness to the posterior calf.  No erythema, no signs of infection. no midline spinal tenderness noted.  Well-healing wounds to the back.    CONSTITUTIONAL: Well-developed; well-nourished; in no acute distress.   SKIN: warm, dry  HEAD: Normocephalic; atraumatic.  EYES: PERRL, EOMI, no conjunctival erythema  ENT: No nasal discharge; airway clear.  NECK: Supple; non tender.  CARD: S1, S2 normal; no murmurs, gallops, or rubs. Regular rate and rhythm.   RESP: No wheezes, rales or rhonchi.  ABD: soft ntnd  EXT: Normal ROM. LLE is edematous >RLE. DP pulse 2+ to the LEs. tenderness to the left calf- no bony tenderness.   NEURO: Alert, oriented, grossly unremarkable.  PSYCH: Cooperative, appropriate.

## 2024-03-27 NOTE — ED PROVIDER NOTE - PHYSICAL EXAMINATION
Physical Exam    Vital Signs: I have reviewed the initial vital signs.  Constitutional: well-nourished, appears stated age, no acute distress  Skin: warm, dry  Muskuloskeletal: LLE: +tender, swelling left calf. n/v intact  Neuro: AOx3, No focal deficits noted

## 2024-03-27 NOTE — ED ADULT NURSE NOTE - OBJECTIVE STATEMENT
Pt A+Ox4, ambulates with steady gait. Pt c/o left leg pain, denies trauma occurring. Pt states he had spinal fusion sx in Feb.

## 2024-03-28 ENCOUNTER — TRANSCRIPTION ENCOUNTER (OUTPATIENT)
Age: 39
End: 2024-03-28

## 2024-03-28 ENCOUNTER — RESULT REVIEW (OUTPATIENT)
Age: 39
End: 2024-03-28

## 2024-03-28 VITALS
DIASTOLIC BLOOD PRESSURE: 83 MMHG | RESPIRATION RATE: 18 BRPM | SYSTOLIC BLOOD PRESSURE: 142 MMHG | OXYGEN SATURATION: 98 % | TEMPERATURE: 98 F | HEART RATE: 83 BPM

## 2024-03-28 LAB
A1C WITH ESTIMATED AVERAGE GLUCOSE RESULT: 11.5 % — HIGH (ref 4–5.6)
ALBUMIN SERPL ELPH-MCNC: 4.2 G/DL — SIGNIFICANT CHANGE UP (ref 3.5–5.2)
ALP SERPL-CCNC: 102 U/L — SIGNIFICANT CHANGE UP (ref 30–115)
ALT FLD-CCNC: 23 U/L — SIGNIFICANT CHANGE UP (ref 0–41)
ANION GAP SERPL CALC-SCNC: 12 MMOL/L — SIGNIFICANT CHANGE UP (ref 7–14)
AST SERPL-CCNC: 17 U/L — SIGNIFICANT CHANGE UP (ref 0–41)
BILIRUB SERPL-MCNC: 0.6 MG/DL — SIGNIFICANT CHANGE UP (ref 0.2–1.2)
BLD GP AB SCN SERPL QL: SIGNIFICANT CHANGE UP
BUN SERPL-MCNC: 11 MG/DL — SIGNIFICANT CHANGE UP (ref 10–20)
CALCIUM SERPL-MCNC: 9.6 MG/DL — SIGNIFICANT CHANGE UP (ref 8.4–10.4)
CHLORIDE SERPL-SCNC: 97 MMOL/L — LOW (ref 98–110)
CO2 SERPL-SCNC: 25 MMOL/L — SIGNIFICANT CHANGE UP (ref 17–32)
CREAT SERPL-MCNC: 0.8 MG/DL — SIGNIFICANT CHANGE UP (ref 0.7–1.5)
EGFR: 115 ML/MIN/1.73M2 — SIGNIFICANT CHANGE UP
ESTIMATED AVERAGE GLUCOSE: 283 MG/DL — HIGH (ref 68–114)
GLUCOSE SERPL-MCNC: 286 MG/DL — HIGH (ref 70–99)
HCT VFR BLD CALC: 38.2 % — LOW (ref 42–52)
HGB BLD-MCNC: 12.9 G/DL — LOW (ref 14–18)
LDLC SERPL DIRECT ASSAY-MCNC: 105 MG/DL — SIGNIFICANT CHANGE UP
LIPID PNL WITH DIRECT LDL SERPL: ABNORMAL
MAGNESIUM SERPL-MCNC: 1.7 MG/DL — LOW (ref 1.8–2.4)
MCHC RBC-ENTMCNC: 25 PG — LOW (ref 27–31)
MCHC RBC-ENTMCNC: 33.8 G/DL — SIGNIFICANT CHANGE UP (ref 32–37)
MCV RBC AUTO: 74 FL — LOW (ref 80–94)
NRBC # BLD: 0 /100 WBCS — SIGNIFICANT CHANGE UP (ref 0–0)
PLATELET # BLD AUTO: 305 K/UL — SIGNIFICANT CHANGE UP (ref 130–400)
PMV BLD: 9.5 FL — SIGNIFICANT CHANGE UP (ref 7.4–10.4)
POTASSIUM SERPL-MCNC: 4.2 MMOL/L — SIGNIFICANT CHANGE UP (ref 3.5–5)
POTASSIUM SERPL-SCNC: 4.2 MMOL/L — SIGNIFICANT CHANGE UP (ref 3.5–5)
PROT SERPL-MCNC: 6.8 G/DL — SIGNIFICANT CHANGE UP (ref 6–8)
RBC # BLD: 5.16 M/UL — SIGNIFICANT CHANGE UP (ref 4.7–6.1)
RBC # FLD: 13 % — SIGNIFICANT CHANGE UP (ref 11.5–14.5)
SODIUM SERPL-SCNC: 134 MMOL/L — LOW (ref 135–146)
TSH SERPL-MCNC: 0 UIU/ML — LOW (ref 0.27–4.2)
WBC # BLD: 7.25 K/UL — SIGNIFICANT CHANGE UP (ref 4.8–10.8)
WBC # FLD AUTO: 7.25 K/UL — SIGNIFICANT CHANGE UP (ref 4.8–10.8)

## 2024-03-28 PROCEDURE — 93306 TTE W/DOPPLER COMPLETE: CPT | Mod: 26

## 2024-03-28 PROCEDURE — 99238 HOSP IP/OBS DSCHRG MGMT 30/<: CPT

## 2024-03-28 RX ORDER — OXYCODONE AND ACETAMINOPHEN 5; 325 MG/1; MG/1
1 TABLET ORAL
Qty: 20 | Refills: 0
Start: 2024-03-28 | End: 2024-04-01

## 2024-03-28 RX ORDER — APIXABAN 2.5 MG/1
2 TABLET, FILM COATED ORAL
Qty: 240 | Refills: 0
Start: 2024-03-28 | End: 2024-05-26

## 2024-03-28 RX ORDER — MAGNESIUM SULFATE 500 MG/ML
1 VIAL (ML) INJECTION ONCE
Refills: 0 | Status: COMPLETED | OUTPATIENT
Start: 2024-03-28 | End: 2024-03-28

## 2024-03-28 RX ORDER — METFORMIN HYDROCHLORIDE 850 MG/1
1 TABLET ORAL
Qty: 120 | Refills: 0
Start: 2024-03-28 | End: 2024-05-26

## 2024-03-28 RX ADMIN — Medication 100 GRAM(S): at 11:47

## 2024-03-28 RX ADMIN — APIXABAN 10 MILLIGRAM(S): 2.5 TABLET, FILM COATED ORAL at 10:52

## 2024-03-28 NOTE — DISCHARGE NOTE PROVIDER - PROVIDER TOKENS
PROVIDER:[TOKEN:[[id number]],FOLLOWUP:[1 week]],PROVIDER:[TOKEN:[[id number]],FOLLOWUP:[1 week]] PROVIDER:[TOKEN:[[id number]],FOLLOWUP:[1 week]],PROVIDER:[TOKEN:[[id number]],FOLLOWUP:[1 week]],PROVIDER:[TOKEN:[[id number]],FOLLOWUP:[1 week]]

## 2024-03-28 NOTE — DISCHARGE NOTE PROVIDER - NSDCCPCAREPLAN_GEN_ALL_CORE_FT
PRINCIPAL DISCHARGE DIAGNOSIS  Diagnosis: Pulmonary embolism  Assessment and Plan of Treatment: 1) Duplex LE --> Right lower extremity negative for DVT. DVT in the left femoral, popliteal, gastrocnemius, posterior tibial, peroneal, and anterior tibial veins.  2) CTA Chest -->  Findings positive for pulmonary emboli within the segmental left lower arterial branch and subsegmental left upper lobe arterial branch.  we found the above on scans   YOU NEED TO TAKE ELIQUIS AS PRESCRIBED. 10MG TWICE A DAY UNTIL 4/4 THEN 5MG TWICE A DAY STARTING 5/5  FOLLOW WITH YOUR PCP OUTPATIENT WITHIN A WEEK OF DISCHARGE   YOU NEED TO FOLLOW WITH VASCULAR DR. FLORES IN 1 WEEK   you need to start wearing compression stockings   you have very elevated levels of triglycerides, fu with your pcp to start medication         SECONDARY DISCHARGE DIAGNOSES  Diagnosis: DVT, lower extremity  Assessment and Plan of Treatment:      PRINCIPAL DISCHARGE DIAGNOSIS  Diagnosis: Pulmonary embolism  Assessment and Plan of Treatment: 1) Duplex LE --> Right lower extremity negative for DVT. DVT in the left femoral, popliteal, gastrocnemius, posterior tibial, peroneal, and anterior tibial veins.  2) CTA Chest -->  Findings positive for pulmonary emboli within the segmental left lower arterial branch and subsegmental left upper lobe arterial branch.  we found the above on scans   YOU NEED TO TAKE ELIQUIS AS PRESCRIBED. 10MG TWICE A DAY UNTIL 4/4 THEN 5MG TWICE A DAY STARTING 5/5  FOLLOW WITH YOUR PCP OUTPATIENT WITHIN A WEEK OF DISCHARGE   please discuss hypercoaguable potential causes with your pcp   YOUR A1C IS 11.5, VERY ELEVATED, you have diabetes.   you need to see an endocrinologist or your pcp to start diabetic medication   YOU NEED TO FOLLOW WITH VASCULAR DR. FLORES IN 1 WEEK   you need to start wearing compression stockings   you have very elevated levels of triglycerides, fu with your pcp to start medication         SECONDARY DISCHARGE DIAGNOSES  Diagnosis: DVT, lower extremity  Assessment and Plan of Treatment:      PRINCIPAL DISCHARGE DIAGNOSIS  Diagnosis: Pulmonary embolism  Assessment and Plan of Treatment: 1) Duplex LE --> Right lower extremity negative for DVT. DVT in the left femoral, popliteal, gastrocnemius, posterior tibial, peroneal, and anterior tibial veins.  2) CTA Chest -->  Findings positive for pulmonary emboli within the segmental left lower arterial branch and subsegmental left upper lobe arterial branch.  we found the above on scans   YOU NEED TO TAKE ELIQUIS AS PRESCRIBED. 10MG TWICE A DAY UNTIL 4/4 THEN 5MG TWICE A DAY STARTING 5/5  FOLLOW WITH YOUR PCP OUTPATIENT WITHIN A WEEK OF DISCHARGE   please discuss hypercoaguable potential causes with your pcp   YOUR A1C IS 11.5, VERY ELEVATED, you have diabetes.   restart metformin, i sent to your pharmacy   you need to see an endocrinologist or your pcp to start diabetic medication   YOUR TSH IS ALSO 0, you may have hyperthyroidism. YOU NEED TO SEE AN ENDOCRINOLOGIST TO WORK THIS UP.   YOU NEED TO FOLLOW WITH VASCULAR DR. FLORES IN 1 WEEK   you need to start wearing compression stockings   you have very elevated levels of triglycerides, fu with your pcp to start medication         SECONDARY DISCHARGE DIAGNOSES  Diagnosis: DVT, lower extremity  Assessment and Plan of Treatment:

## 2024-03-28 NOTE — PROGRESS NOTE ADULT - SUBJECTIVE AND OBJECTIVE BOX
INTERVAL HPI/OVERNIGHT EVENTS:    38M w/ PMHx of DM, x2 herniated discs s/p back surgery in 9/2023, here for LLE calf pain. Venous duplex reporting extensive DVT of the LLE. CTA Chest showing segmental left lower arterial branch and subsegmental left upper lobe arterial branch.  c/o pain in rt leg    REVIEW OF SYSTEMS:  CONSTITUTIONAL: leg pain   EYES: No eye pain, visual disturbances, or discharge  ENMT:  No difficulty hearing, tinnitus, vertigo; No sinus or throat pain  NECK: No pain or stiffness  BREASTS: No pain, masses, or nipple discharge  RESPIRATORY: No cough, wheezing, chills or hemoptysis; No shortness of breath  CARDIOVASCULAR: No chest pain, palpitations, dizziness, or leg swelling  GASTROINTESTINAL: No abdominal or epigastric pain. No nausea, vomiting, or hematemesis; No diarrhea or constipation. No melena or hematochezia.  GENITOURINARY: No dysuria, frequency, hematuria, or incontinence  NEUROLOGICAL: No headaches, memory loss, loss of strength, numbness, or tremors  SKIN: No itching, burning, rashes, or lesions   LYMPH NODES: No enlarged glands  ENDOCRINE: No heat or cold intolerance; No hair loss  MUSCULOSKELETAL: No joint pain or swelling; No muscle, back, or extremity pain  PSYCHIATRIC: No depression, anxiety, mood swings, or difficulty sleeping  HEME/LYMPH: No easy bruising, or bleeding gums  ALLERY AND IMMUNOLOGIC: No hives or eczema    VITALS:  T(F): 96.9, Max: 98.2 (03-27-24 @ 15:14)  HR: 68  BP: 141/84  RR: 19  SpO2: 98%    PHYSICAL EXAM:  GENERAL: NAD,   HEAD:  Atraumatic, Normocephalic  EYES: EOMI, PERRLA, conjunctiva and sclera clear  ENMT: No tonsillar erythema, exudates, or enlargement; Moist mucous membranes, Good dentition, No lesions  NECK: Supple, No JVD, Normal thyroid  NERVOUS SYSTEM:  Alert & Oriented X3, Good concentration; Motor Strength 5/5 B/L upper and lower extremities; DTRs 2+ intact and symmetric  CHEST/LUNG: Clear to percussion bilaterally; No rales, rhonchi, wheezing, or rubs  HEART: Regular rate and rhythm; No murmurs, rubs, or gallops  ABDOMEN: Soft, Nontender, Nondistended; Bowel sounds present  EXTREMITIES: no edema  LYMPH: No lymphadenopathy noted  SKIN: No rashes or lesions      LABS:  Urinalysis Basic - ( 28 Mar 2024 08:59 )    Color: x / Appearance: x / SG: x / pH: x  Gluc: 286 mg/dL / Ketone: x  / Bili: x / Urobili: x   Blood: x / Protein: x / Nitrite: x   Leuk Esterase: x / RBC: x / WBC x   Sq Epi: x / Non Sq Epi: x / Bacteria: x      03-28    134<L>  |  97<L>  |  11  ----------------------------<  286<H>  4.2   |  25  |  0.8    Ca    9.6      28 Mar 2024 08:59  Mg     1.7     03-28    TPro  6.8  /  Alb  4.2  /  TBili  0.6  /  DBili  x   /  AST  17  /  ALT  23  /  AlkPhos  102  03-28                          12.9   7.25  )-----------( 305      ( 28 Mar 2024 08:59 )             38.2           RADIOLOGY & ADDITIONAL TESTS:    Imaging Personally Reviewed:  [ ] YES  [ ] NO    MEDICATIONS:     MEDICATIONS  (STANDING):  apixaban 10 milliGRAM(s) Oral every 12 hours    MEDICATIONS  (PRN):  oxycodone    5 mG/acetaminophen 325 mG 1 Tablet(s) Oral every 4 hours PRN Severe Pain (7 - 10)

## 2024-03-28 NOTE — DISCHARGE NOTE NURSING/CASE MANAGEMENT/SOCIAL WORK - PATIENT PORTAL LINK FT
You can access the FollowMyHealth Patient Portal offered by [facility] by registering at the following website: [email or web address]. By joining [employer]’s FollowMyHealth portal, you will also be able to view your health information using other applications (apps) compatible with our system.

## 2024-03-28 NOTE — DISCHARGE NOTE PROVIDER - CARE PROVIDERS DIRECT ADDRESSES
[email or web address]_Unknown ,[email or web address],DirectAddress_Unknown,[email or web address]

## 2024-03-28 NOTE — DISCHARGE NOTE PROVIDER - CARE PROVIDER_API CALL
Lorraine Arias  Vascular Surgery  [address] [place] [address]  Phone: [phone]  Fax: [phone]  Follow Up Time: 1 week    Henrique Smith  Internal Medicine  [address] [place] [address]  Phone: [phone]  Fax: [phone]  Follow Up Time: 1 week   Lorraine Arias  Vascular Surgery  [address] [place] [address]  Phone: [phone]  Fax: [phone]  Follow Up Time: 1 week    Henrique Smith  Internal Medicine  [address] [place] [address]  Phone: [phone]  Fax: [phone]  Follow Up Time: 1 week    Cherise Mackay  Endocrinology/Metab/Diabetes  [address] [place] [address]  Phone: [phone]  Fax: [phone]  Follow Up Time: 1 week

## 2024-03-28 NOTE — PHYSICAL THERAPY INITIAL EVALUATION ADULT - SPECIFY REASON(S)
Pt with dx of PE and LE DVT, received first dose of AC at 1543, PT to f/u after 24 hours for safe mobility.

## 2024-03-28 NOTE — DISCHARGE NOTE PROVIDER - NSDCMRMEDTOKEN_GEN_ALL_CORE_FT
apixaban 5 mg oral tablet: 2 tab(s) orally every 12 hours take 10mg twice a day for 6 more days, until 4/4, then on 4/5 start taking 5mg twice a day   apixaban 5 mg oral tablet: 2 tab(s) orally every 12 hours take 10mg twice a day for 6 more days, until 4/4, then on 4/5 start taking 5mg twice a day  metFORMIN 500 mg oral tablet: 1 tab(s) orally 2 times a day

## 2024-03-28 NOTE — DISCHARGE NOTE PROVIDER - HOSPITAL COURSE
38M w/ PMHx of DM, x2 herniated discs s/p back surgery in 2023, here for LLE calf pain. Venous duplex reporting extensive DVT of the LLE. CTA Chest showing segmental left lower arterial branch and subsegmental left upper lobe arterial branch. 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 DVT in the left femoral, popliteal, gastrocnemius, posterior tibial, peroneal, and anterior tibial veins.  2) CTA Chest -->  Findings positive for pulmonary emboli within the segmental left lower arterial branch and subsegmental left upper lobe arterial branch.        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 Chacon in 1 week   - DC , follow up with pcp for hypercoaguble causes   -DC with couple days of percocet for pain     # DM   - Patient says he was on metformin but PCP told him to stop it

## 2024-03-28 NOTE — PROGRESS NOTE ADULT - ASSESSMENT
38M w/ PMHx of DM, x2 herniated discs s/p back surgery in 9/2023, here for LLE calf pain. Venous duplex reporting extensive DVT of the LLE. CTA Chest showing segmental left lower arterial branch and subsegmental left upper lobe arterial branch.    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 Chacon in 1 week       # DM   - Patient says he was on metformin but PCP told him to stop it     # DVT prophylaxis - Eliquis 10 BID   # GI prophylaxis - Not Indicated      discharge today

## 2024-04-04 DIAGNOSIS — I82.452 ACUTE EMBOLISM AND THROMBOSIS OF LEFT PERONEAL VEIN: ICD-10-CM

## 2024-04-04 DIAGNOSIS — E11.9 TYPE 2 DIABETES MELLITUS WITHOUT COMPLICATIONS: ICD-10-CM

## 2024-04-04 DIAGNOSIS — I26.93 SINGLE SUBSEGMENTAL PULMONARY EMBOLISM WITHOUT ACUTE COR PULMONALE: ICD-10-CM

## 2024-04-04 DIAGNOSIS — I82.432 ACUTE EMBOLISM AND THROMBOSIS OF LEFT POPLITEAL VEIN: ICD-10-CM

## 2024-04-04 DIAGNOSIS — I82.462 ACUTE EMBOLISM AND THROMBOSIS OF LEFT CALF MUSCULAR VEIN: ICD-10-CM

## 2024-04-04 DIAGNOSIS — M79.662 PAIN IN LEFT LOWER LEG: ICD-10-CM

## 2024-04-04 DIAGNOSIS — I82.412 ACUTE EMBOLISM AND THROMBOSIS OF LEFT FEMORAL VEIN: ICD-10-CM

## 2024-04-04 DIAGNOSIS — I26.99 OTHER PULMONARY EMBOLISM WITHOUT ACUTE COR PULMONALE: ICD-10-CM

## 2024-04-04 DIAGNOSIS — I82.442 ACUTE EMBOLISM AND THROMBOSIS OF LEFT TIBIAL VEIN: ICD-10-CM

## 2024-06-05 ENCOUNTER — INPATIENT (INPATIENT)
Facility: HOSPITAL | Age: 39
LOS: 2 days | Discharge: HOME | DRG: 872 | End: 2024-06-08
Attending: INTERNAL MEDICINE | Admitting: STUDENT IN AN ORGANIZED HEALTH CARE EDUCATION/TRAINING PROGRAM
Payer: COMMERCIAL

## 2024-06-05 VITALS
RESPIRATION RATE: 18 BRPM | TEMPERATURE: 99 F | DIASTOLIC BLOOD PRESSURE: 66 MMHG | OXYGEN SATURATION: 99 % | HEART RATE: 114 BPM | SYSTOLIC BLOOD PRESSURE: 134 MMHG

## 2024-06-05 DIAGNOSIS — Z98.890 OTHER SPECIFIED POSTPROCEDURAL STATES: Chronic | ICD-10-CM

## 2024-06-05 DIAGNOSIS — L02.31 CUTANEOUS ABSCESS OF BUTTOCK: ICD-10-CM

## 2024-06-05 LAB
ALBUMIN SERPL ELPH-MCNC: 4.5 G/DL — SIGNIFICANT CHANGE UP (ref 3.5–5.2)
ALP SERPL-CCNC: 103 U/L — SIGNIFICANT CHANGE UP (ref 30–115)
ALT FLD-CCNC: 15 U/L — SIGNIFICANT CHANGE UP (ref 0–41)
ANION GAP SERPL CALC-SCNC: 15 MMOL/L — HIGH (ref 7–14)
APTT BLD: 31.7 SEC — SIGNIFICANT CHANGE UP (ref 27–39.2)
AST SERPL-CCNC: 13 U/L — SIGNIFICANT CHANGE UP (ref 0–41)
BASOPHILS # BLD AUTO: 0.06 K/UL — SIGNIFICANT CHANGE UP (ref 0–0.2)
BASOPHILS NFR BLD AUTO: 0.5 % — SIGNIFICANT CHANGE UP (ref 0–1)
BILIRUB SERPL-MCNC: 0.5 MG/DL — SIGNIFICANT CHANGE UP (ref 0.2–1.2)
BUN SERPL-MCNC: 17 MG/DL — SIGNIFICANT CHANGE UP (ref 10–20)
CALCIUM SERPL-MCNC: 9.1 MG/DL — SIGNIFICANT CHANGE UP (ref 8.4–10.5)
CHLORIDE SERPL-SCNC: 93 MMOL/L — LOW (ref 98–110)
CO2 SERPL-SCNC: 23 MMOL/L — SIGNIFICANT CHANGE UP (ref 17–32)
CREAT SERPL-MCNC: 1.1 MG/DL — SIGNIFICANT CHANGE UP (ref 0.7–1.5)
EGFR: 88 ML/MIN/1.73M2 — SIGNIFICANT CHANGE UP
EOSINOPHIL # BLD AUTO: 0.03 K/UL — SIGNIFICANT CHANGE UP (ref 0–0.7)
EOSINOPHIL NFR BLD AUTO: 0.2 % — SIGNIFICANT CHANGE UP (ref 0–8)
GLUCOSE SERPL-MCNC: 376 MG/DL — HIGH (ref 70–99)
HCT VFR BLD CALC: 40.1 % — LOW (ref 42–52)
HGB BLD-MCNC: 13.6 G/DL — LOW (ref 14–18)
IMM GRANULOCYTES NFR BLD AUTO: 1.2 % — HIGH (ref 0.1–0.3)
INR BLD: 1.11 RATIO — SIGNIFICANT CHANGE UP (ref 0.65–1.3)
LACTATE SERPL-SCNC: 2.3 MMOL/L — HIGH (ref 0.7–2)
LACTATE SERPL-SCNC: 2.5 MMOL/L — HIGH (ref 0.7–2)
LYMPHOCYTES # BLD AUTO: 1.34 K/UL — SIGNIFICANT CHANGE UP (ref 1.2–3.4)
LYMPHOCYTES # BLD AUTO: 10.5 % — LOW (ref 20.5–51.1)
MCHC RBC-ENTMCNC: 26.4 PG — LOW (ref 27–31)
MCHC RBC-ENTMCNC: 33.9 G/DL — SIGNIFICANT CHANGE UP (ref 32–37)
MCV RBC AUTO: 77.9 FL — LOW (ref 80–94)
MONOCYTES # BLD AUTO: 1.07 K/UL — HIGH (ref 0.1–0.6)
MONOCYTES NFR BLD AUTO: 8.4 % — SIGNIFICANT CHANGE UP (ref 1.7–9.3)
NEUTROPHILS # BLD AUTO: 10.09 K/UL — HIGH (ref 1.4–6.5)
NEUTROPHILS NFR BLD AUTO: 79.2 % — HIGH (ref 42.2–75.2)
NRBC # BLD: 0 /100 WBCS — SIGNIFICANT CHANGE UP (ref 0–0)
PLATELET # BLD AUTO: 266 K/UL — SIGNIFICANT CHANGE UP (ref 130–400)
PMV BLD: 9.1 FL — SIGNIFICANT CHANGE UP (ref 7.4–10.4)
POTASSIUM SERPL-MCNC: 4.1 MMOL/L — SIGNIFICANT CHANGE UP (ref 3.5–5)
POTASSIUM SERPL-SCNC: 4.1 MMOL/L — SIGNIFICANT CHANGE UP (ref 3.5–5)
PROT SERPL-MCNC: 7.2 G/DL — SIGNIFICANT CHANGE UP (ref 6–8)
PROTHROM AB SERPL-ACNC: 12.7 SEC — SIGNIFICANT CHANGE UP (ref 9.95–12.87)
RBC # BLD: 5.15 M/UL — SIGNIFICANT CHANGE UP (ref 4.7–6.1)
RBC # FLD: 14.9 % — HIGH (ref 11.5–14.5)
SODIUM SERPL-SCNC: 131 MMOL/L — LOW (ref 135–146)
WBC # BLD: 12.74 K/UL — HIGH (ref 4.8–10.8)
WBC # FLD AUTO: 12.74 K/UL — HIGH (ref 4.8–10.8)

## 2024-06-05 PROCEDURE — 84439 ASSAY OF FREE THYROXINE: CPT

## 2024-06-05 PROCEDURE — 83735 ASSAY OF MAGNESIUM: CPT

## 2024-06-05 PROCEDURE — 85027 COMPLETE CBC AUTOMATED: CPT

## 2024-06-05 PROCEDURE — 93010 ELECTROCARDIOGRAM REPORT: CPT

## 2024-06-05 PROCEDURE — 80053 COMPREHEN METABOLIC PANEL: CPT

## 2024-06-05 PROCEDURE — 84480 ASSAY TRIIODOTHYRONINE (T3): CPT

## 2024-06-05 PROCEDURE — 36415 COLL VENOUS BLD VENIPUNCTURE: CPT

## 2024-06-05 PROCEDURE — 80048 BASIC METABOLIC PNL TOTAL CA: CPT

## 2024-06-05 PROCEDURE — 80202 ASSAY OF VANCOMYCIN: CPT

## 2024-06-05 PROCEDURE — 82962 GLUCOSE BLOOD TEST: CPT

## 2024-06-05 PROCEDURE — 84443 ASSAY THYROID STIM HORMONE: CPT

## 2024-06-05 PROCEDURE — 80061 LIPID PANEL: CPT

## 2024-06-05 PROCEDURE — 83036 HEMOGLOBIN GLYCOSYLATED A1C: CPT

## 2024-06-05 PROCEDURE — 85025 COMPLETE CBC W/AUTO DIFF WBC: CPT

## 2024-06-05 PROCEDURE — 83605 ASSAY OF LACTIC ACID: CPT

## 2024-06-05 PROCEDURE — 99285 EMERGENCY DEPT VISIT HI MDM: CPT

## 2024-06-05 PROCEDURE — 74177 CT ABD & PELVIS W/CONTRAST: CPT | Mod: 26,MC

## 2024-06-05 RX ORDER — METRONIDAZOLE 500 MG
500 TABLET ORAL ONCE
Refills: 0 | Status: COMPLETED | OUTPATIENT
Start: 2024-06-05 | End: 2024-06-05

## 2024-06-05 RX ORDER — CEFTRIAXONE 500 MG/1
1000 INJECTION, POWDER, FOR SOLUTION INTRAMUSCULAR; INTRAVENOUS ONCE
Refills: 0 | Status: COMPLETED | OUTPATIENT
Start: 2024-06-05 | End: 2024-06-05

## 2024-06-05 RX ORDER — KETOROLAC TROMETHAMINE 30 MG/ML
15 SYRINGE (ML) INJECTION ONCE
Refills: 0 | Status: DISCONTINUED | OUTPATIENT
Start: 2024-06-05 | End: 2024-06-05

## 2024-06-05 RX ORDER — ACETAMINOPHEN 500 MG
650 TABLET ORAL ONCE
Refills: 0 | Status: COMPLETED | OUTPATIENT
Start: 2024-06-05 | End: 2024-06-05

## 2024-06-05 RX ORDER — ACETAMINOPHEN 500 MG
975 TABLET ORAL ONCE
Refills: 0 | Status: COMPLETED | OUTPATIENT
Start: 2024-06-05 | End: 2024-06-05

## 2024-06-05 RX ORDER — VANCOMYCIN HCL 1 G
1000 VIAL (EA) INTRAVENOUS ONCE
Refills: 0 | Status: COMPLETED | OUTPATIENT
Start: 2024-06-05 | End: 2024-06-05

## 2024-06-05 RX ORDER — SODIUM CHLORIDE 9 MG/ML
2800 INJECTION, SOLUTION INTRAVENOUS ONCE
Refills: 0 | Status: COMPLETED | OUTPATIENT
Start: 2024-06-05 | End: 2024-06-05

## 2024-06-05 RX ORDER — ONDANSETRON 8 MG/1
4 TABLET, FILM COATED ORAL ONCE
Refills: 0 | Status: COMPLETED | OUTPATIENT
Start: 2024-06-05 | End: 2024-06-05

## 2024-06-05 RX ORDER — INSULIN HUMAN 100 [IU]/ML
8 INJECTION, SOLUTION SUBCUTANEOUS ONCE
Refills: 0 | Status: COMPLETED | OUTPATIENT
Start: 2024-06-05 | End: 2024-06-05

## 2024-06-05 RX ORDER — MORPHINE SULFATE 50 MG/1
4 CAPSULE, EXTENDED RELEASE ORAL ONCE
Refills: 0 | Status: DISCONTINUED | OUTPATIENT
Start: 2024-06-05 | End: 2024-06-05

## 2024-06-05 RX ADMIN — SODIUM CHLORIDE 2800 MILLILITER(S): 9 INJECTION, SOLUTION INTRAVENOUS at 17:05

## 2024-06-05 RX ADMIN — Medication 650 MILLIGRAM(S): at 21:29

## 2024-06-05 RX ADMIN — INSULIN HUMAN 8 UNIT(S): 100 INJECTION, SOLUTION SUBCUTANEOUS at 19:36

## 2024-06-05 RX ADMIN — MORPHINE SULFATE 4 MILLIGRAM(S): 50 CAPSULE, EXTENDED RELEASE ORAL at 17:06

## 2024-06-05 RX ADMIN — Medication 15 MILLIGRAM(S): at 21:29

## 2024-06-05 RX ADMIN — Medication 975 MILLIGRAM(S): at 17:05

## 2024-06-05 RX ADMIN — CEFTRIAXONE 100 MILLIGRAM(S): 500 INJECTION, POWDER, FOR SOLUTION INTRAMUSCULAR; INTRAVENOUS at 17:43

## 2024-06-05 RX ADMIN — MORPHINE SULFATE 4 MILLIGRAM(S): 50 CAPSULE, EXTENDED RELEASE ORAL at 17:45

## 2024-06-05 RX ADMIN — Medication 100 MILLIGRAM(S): at 17:12

## 2024-06-05 RX ADMIN — Medication 975 MILLIGRAM(S): at 18:17

## 2024-06-05 RX ADMIN — SODIUM CHLORIDE 2800 MILLILITER(S): 9 INJECTION, SOLUTION INTRAVENOUS at 18:10

## 2024-06-05 RX ADMIN — Medication 250 MILLIGRAM(S): at 18:13

## 2024-06-05 NOTE — PATIENT PROFILE ADULT - FALL HARM RISK - HARM RISK INTERVENTIONS

## 2024-06-05 NOTE — ED PROVIDER NOTE - PHYSICAL EXAMINATION
CONSTITUTIONAL: Well-developed; well-nourished; in no acute distress.   SKIN: warm, dry  HEAD: Normocephalic; atraumatic.  EYES: PERRL, EOMI, no conjunctival erythema  ENT: No nasal discharge; airway clear.  NECK: Supple; non tender.  CARD: S1, S2 normal; no murmurs, gallops, or rubs. Regular rate and rhythm.   RESP: No wheezes, rales or rhonchi.  ABD: soft ntnd  RECTAL: Chaperoned by PCA Dawna, large approx 10 cm area of induration to the left lower buttock- involves perineum. declined rectal exam.   EXT: Normal ROM.  No clubbing, cyanosis or edema.   NEURO: Alert, oriented, grossly unremarkable  PSYCH: Cooperative, appropriate.

## 2024-06-05 NOTE — ED PROVIDER NOTE - OBJECTIVE STATEMENT
38 -year-old male with history of diabetes on metformin, hemoglobin A1c 11.18, PE on Eliquis presenting today with left buttock abscess for the last 3 to 4 days as well as fever.  Patient septic in the ED.  Denies abdominal pain.

## 2024-06-05 NOTE — PATIENT PROFILE ADULT - NSTOBACCONEVERSMOKERY/N_GEN_A
Chief Complaint   Patient presents with     Blood Draw     labs drawn with vpt by rn.  vs taken     Labs drawn with vpt by rn.  Pt tolerated well.  VS taken.  Pt checked in for next appt.    Tabatha Mcneil RN    
No

## 2024-06-05 NOTE — ED ADULT TRIAGE NOTE - CHIEF COMPLAINT QUOTE
pt with a painful large bump on his buttock , sent in from urgent care. ( pt recently put on elliquis).

## 2024-06-05 NOTE — PROCEDURE NOTE - NSCLOSUREDETAILS_GEN_ALL_CORE
Addended by: PAVAN HERBERT on: 1/22/2020 01:41 PM     Modules accepted: Orders     incision left open

## 2024-06-05 NOTE — CONSULT NOTE ADULT - ASSESSMENT
38yM w/ PMHx of DM, herniated discs s/p L5-S1 fusion (9/2023), 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 Physical exam findings, imaging, and labs as documented above.     PLAN:  - S/P bedside incision and drainage - approx 30cc of pus removed and packed with 1/2 inch packing, covered with gauze and abd pad  - Patient to keep packing in place until tomorrow 6/6; can remove while in the shower.  - Recommend Augmentin x 7 days  - Pain control with tylenol and ibuprofen  - Patient on eliquis for hx of DVT and PE - it is expected that patient may bleed more than typical, but if he experiences an excessive amount or has any new symptoms including dizziness or feeling faint, he was advised to return to the ED for further eval  - Dispo per ED    Above plan discussed with Attending Surgeon Dr. Beaulieu, patient, patient family, and ED team  06-05-24 @ 22:40    SURGERY CONSULTS: 6699 X1

## 2024-06-05 NOTE — CONSULT NOTE ADULT - SUBJECTIVE AND OBJECTIVE BOX
GENERAL SURGERY CONSULT NOTE    Patient: RASHAAD CORDERO , 38y ([birth year])Male   MRN: [id number]  Location: [facility] ED  Visit: 06-05-24 Emergency  Date: 06-05-24 @ 22:40    HPI: 38y Male w/ PMHx of DM, herniated discs s/p L5-S1 fusion (9/2023), 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 Patient reports he began having pain and felt a bump on his buttock on Sunday. Gradually worsened and got larger. Denies any drainage. Denies fevers or chills at home. Has never had an abscess previously.     PAST MEDICAL & SURGICAL HISTORY:  Diabetes  History of back surgery    Home Medications:      VITALS:  T(F): 100.8 (06-05-24 @ 22:16), Max: 101.2 (06-05-24 @ 20:52)  HR: 93 (06-05-24 @ 20:52) (93 - 114)  BP: 153/85 (06-05-24 @ 20:52) (134/66 - 166/91)  RR: 18 (06-05-24 @ 20:52) (18 - 18)  SpO2: 98% (06-05-24 @ 20:52) (98% - 99%)    PHYSICAL EXAM:  General: NAD, AAOx3  HEENT: EOMI, Trachea ML, Neck supple  Cardiac: RRR   Respiratory: Chest rise equal bilaterally, no labored breathing  Abdomen: Soft, non-distended, non-tender  Rectal: erythematous, fluctuant, indurated left perianal abscess, no blood, no fistulas, fissures, hemorrhoids  Extremities: NOVOA, well-perfused  Skin: Warm/dry, normal color, no jaundice    LAB/STUDIES:                        13.6   12.74 )-----------( 266      ( 05 Jun 2024 17:01 )             40.1     06-05    131<L>  |  93<L>  |  17  ----------------------------<  376<H>  4.1   |  23  |  1.1    Ca    9.1      05 Jun 2024 17:01    TPro  7.2  /  Alb  4.5  /  TBili  0.5  /  DBili  x   /  AST  13  /  ALT  15  /  AlkPhos  103  06-05    PT/INR - ( 05 Jun 2024 17:01 )   PT: 12.70 sec;   INR: 1.11 ratio       PTT - ( 05 Jun 2024 17:01 )  PTT:31.7 sec  LIVER FUNCTIONS - ( 05 Jun 2024 17:01 )  Alb: 4.5 g/dL / Pro: 7.2 g/dL / ALK PHOS: 103 U/L / ALT: 15 U/L / AST: 13 U/L / GGT: x           Urinalysis Basic - ( 05 Jun 2024 17:01 )    Color: x / Appearance: x / SG: x / pH: x  Gluc: 376 mg/dL / Ketone: x  / Bili: x / Urobili: x   Blood: x / Protein: x / Nitrite: x   Leuk Esterase: x / RBC: x / WBC x   Sq Epi: x / Non Sq Epi: x / Bacteria: x      IMAGING:  < from: CT Abdomen and Pelvis w/ IV Cont (06.05.24 @ 17:28) >  FINDINGS:    LOWER CHEST/HEART: Unremarkable.    HEPATOBILIARY: No biliary ductal dilatation. No radiopaque gallstones.    SPLEEN: Unremarkable.    PANCREAS: Unremarkable.    ADRENAL GLANDS: Unremarkable.    KIDNEYS: Symmetric renal enhancement. No hydronephrosis.    ABDOMINOPELVIC NODES: No lymphadenopathy by size criteria.    PELVIC ORGANS: Unremarkable.    PERITONEUM/MESENTERY/BOWEL: Postsurgical changes along the greater   curvature the stomach. No pneumoperitoneum. No obstruction or ascites.   Unremarkable appendix.    BONES/SOFT TISSUES: Abscess along the left medial gluteal fold measuring   6.1 x 3.9 cm. Bone island on the left femoral head. Surgical hardware   L5-S1. Mild bilateral gynecomastia.    VASCULAR: Normal caliber abdominal aorta.      IMPRESSION:    Abscess along the left medial gluteal fold measuring 6.1 x 3.9 cm.    < end of copied text >      ACCESS DEVICES:  [x] Peripheral IV

## 2024-06-05 NOTE — ED PROVIDER NOTE - CLINICAL SUMMARY MEDICAL DECISION MAKING FREE TEXT BOX
38 -year-old male with history of diabetes on metformin, hemoglobin A1c 11.18, PE on Eliquis presenting today with left buttock abscess for the last 3 to 4 days as well as fever.  Patient septic in the ED.  Denies abdominal pain. exam with large abscess. labs with hyperglycemia, leukocytosis. given iv abx. ct with abscess. s/p I&D by surgery. admitted for further management.

## 2024-06-06 PROBLEM — E11.9 TYPE 2 DIABETES MELLITUS WITHOUT COMPLICATIONS: Chronic | Status: ACTIVE | Noted: 2024-03-27

## 2024-06-06 LAB
A1C WITH ESTIMATED AVERAGE GLUCOSE RESULT: 9.7 % — HIGH (ref 4–5.6)
ANION GAP SERPL CALC-SCNC: 11 MMOL/L — SIGNIFICANT CHANGE UP (ref 7–14)
BASOPHILS # BLD AUTO: 0.06 K/UL — SIGNIFICANT CHANGE UP (ref 0–0.2)
BASOPHILS NFR BLD AUTO: 0.5 % — SIGNIFICANT CHANGE UP (ref 0–1)
BUN SERPL-MCNC: 11 MG/DL — SIGNIFICANT CHANGE UP (ref 10–20)
CALCIUM SERPL-MCNC: 9 MG/DL — SIGNIFICANT CHANGE UP (ref 8.4–10.5)
CHLORIDE SERPL-SCNC: 96 MMOL/L — LOW (ref 98–110)
CO2 SERPL-SCNC: 26 MMOL/L — SIGNIFICANT CHANGE UP (ref 17–32)
CREAT SERPL-MCNC: 1 MG/DL — SIGNIFICANT CHANGE UP (ref 0.7–1.5)
EGFR: 98 ML/MIN/1.73M2 — SIGNIFICANT CHANGE UP
EOSINOPHIL # BLD AUTO: 0.07 K/UL — SIGNIFICANT CHANGE UP (ref 0–0.7)
EOSINOPHIL NFR BLD AUTO: 0.6 % — SIGNIFICANT CHANGE UP (ref 0–8)
ESTIMATED AVERAGE GLUCOSE: 232 MG/DL — HIGH (ref 68–114)
GLUCOSE BLDC GLUCOMTR-MCNC: 259 MG/DL — HIGH (ref 70–99)
GLUCOSE BLDC GLUCOMTR-MCNC: 260 MG/DL — HIGH (ref 70–99)
GLUCOSE BLDC GLUCOMTR-MCNC: 264 MG/DL — HIGH (ref 70–99)
GLUCOSE SERPL-MCNC: 281 MG/DL — HIGH (ref 70–99)
GRAM STN FLD: ABNORMAL
HCT VFR BLD CALC: 39.1 % — LOW (ref 42–52)
HGB BLD-MCNC: 13.3 G/DL — LOW (ref 14–18)
IMM GRANULOCYTES NFR BLD AUTO: 1 % — HIGH (ref 0.1–0.3)
LACTATE SERPL-SCNC: 1.3 MMOL/L — SIGNIFICANT CHANGE UP (ref 0.7–2)
LYMPHOCYTES # BLD AUTO: 2.34 K/UL — SIGNIFICANT CHANGE UP (ref 1.2–3.4)
LYMPHOCYTES # BLD AUTO: 21.4 % — SIGNIFICANT CHANGE UP (ref 20.5–51.1)
MCHC RBC-ENTMCNC: 26.4 PG — LOW (ref 27–31)
MCHC RBC-ENTMCNC: 34 G/DL — SIGNIFICANT CHANGE UP (ref 32–37)
MCV RBC AUTO: 77.6 FL — LOW (ref 80–94)
MONOCYTES # BLD AUTO: 0.99 K/UL — HIGH (ref 0.1–0.6)
MONOCYTES NFR BLD AUTO: 9 % — SIGNIFICANT CHANGE UP (ref 1.7–9.3)
NEUTROPHILS # BLD AUTO: 7.39 K/UL — HIGH (ref 1.4–6.5)
NEUTROPHILS NFR BLD AUTO: 67.5 % — SIGNIFICANT CHANGE UP (ref 42.2–75.2)
NRBC # BLD: 0 /100 WBCS — SIGNIFICANT CHANGE UP (ref 0–0)
PLATELET # BLD AUTO: 278 K/UL — SIGNIFICANT CHANGE UP (ref 130–400)
PMV BLD: 9.3 FL — SIGNIFICANT CHANGE UP (ref 7.4–10.4)
POTASSIUM SERPL-MCNC: 4.1 MMOL/L — SIGNIFICANT CHANGE UP (ref 3.5–5)
POTASSIUM SERPL-SCNC: 4.1 MMOL/L — SIGNIFICANT CHANGE UP (ref 3.5–5)
RBC # BLD: 5.04 M/UL — SIGNIFICANT CHANGE UP (ref 4.7–6.1)
RBC # FLD: 15.2 % — HIGH (ref 11.5–14.5)
SODIUM SERPL-SCNC: 133 MMOL/L — LOW (ref 135–146)
SPECIMEN SOURCE: SIGNIFICANT CHANGE UP
WBC # BLD: 10.96 K/UL — HIGH (ref 4.8–10.8)
WBC # FLD AUTO: 10.96 K/UL — HIGH (ref 4.8–10.8)

## 2024-06-06 PROCEDURE — 99223 1ST HOSP IP/OBS HIGH 75: CPT

## 2024-06-06 RX ORDER — INSULIN GLARGINE 100 [IU]/ML
15 INJECTION, SOLUTION SUBCUTANEOUS AT BEDTIME
Refills: 0 | Status: DISCONTINUED | OUTPATIENT
Start: 2024-06-06 | End: 2024-06-07

## 2024-06-06 RX ORDER — ACETAMINOPHEN 500 MG
650 TABLET ORAL EVERY 6 HOURS
Refills: 0 | Status: DISCONTINUED | OUTPATIENT
Start: 2024-06-06 | End: 2024-06-08

## 2024-06-06 RX ORDER — DEXTROSE 10 % IN WATER 10 %
125 INTRAVENOUS SOLUTION INTRAVENOUS ONCE
Refills: 0 | Status: DISCONTINUED | OUTPATIENT
Start: 2024-06-06 | End: 2024-06-08

## 2024-06-06 RX ORDER — VANCOMYCIN HCL 1 G
1750 VIAL (EA) INTRAVENOUS EVERY 12 HOURS
Refills: 0 | Status: DISCONTINUED | OUTPATIENT
Start: 2024-06-06 | End: 2024-06-08

## 2024-06-06 RX ORDER — INSULIN LISPRO 100/ML
VIAL (ML) SUBCUTANEOUS
Refills: 0 | Status: DISCONTINUED | OUTPATIENT
Start: 2024-06-06 | End: 2024-06-08

## 2024-06-06 RX ORDER — DEXTROSE 50 % IN WATER 50 %
12.5 SYRINGE (ML) INTRAVENOUS ONCE
Refills: 0 | Status: DISCONTINUED | OUTPATIENT
Start: 2024-06-06 | End: 2024-06-08

## 2024-06-06 RX ORDER — APIXABAN 2.5 MG/1
5 TABLET, FILM COATED ORAL EVERY 12 HOURS
Refills: 0 | Status: DISCONTINUED | OUTPATIENT
Start: 2024-06-06 | End: 2024-06-08

## 2024-06-06 RX ORDER — DEXTROSE 50 % IN WATER 50 %
25 SYRINGE (ML) INTRAVENOUS ONCE
Refills: 0 | Status: DISCONTINUED | OUTPATIENT
Start: 2024-06-06 | End: 2024-06-08

## 2024-06-06 RX ORDER — METRONIDAZOLE 500 MG
500 TABLET ORAL EVERY 8 HOURS
Refills: 0 | Status: DISCONTINUED | OUTPATIENT
Start: 2024-06-06 | End: 2024-06-08

## 2024-06-06 RX ORDER — SODIUM CHLORIDE 9 MG/ML
1000 INJECTION, SOLUTION INTRAVENOUS
Refills: 0 | Status: DISCONTINUED | OUTPATIENT
Start: 2024-06-06 | End: 2024-06-08

## 2024-06-06 RX ORDER — CEFTRIAXONE 500 MG/1
2000 INJECTION, POWDER, FOR SOLUTION INTRAMUSCULAR; INTRAVENOUS EVERY 24 HOURS
Refills: 0 | Status: DISCONTINUED | OUTPATIENT
Start: 2024-06-07 | End: 2024-06-08

## 2024-06-06 RX ORDER — LANOLIN ALCOHOL/MO/W.PET/CERES
3 CREAM (GRAM) TOPICAL AT BEDTIME
Refills: 0 | Status: DISCONTINUED | OUTPATIENT
Start: 2024-06-06 | End: 2024-06-08

## 2024-06-06 RX ORDER — METRONIDAZOLE 500 MG
500 TABLET ORAL EVERY 8 HOURS
Refills: 0 | Status: DISCONTINUED | OUTPATIENT
Start: 2024-06-06 | End: 2024-06-06

## 2024-06-06 RX ORDER — DEXTROSE 50 % IN WATER 50 %
15 SYRINGE (ML) INTRAVENOUS ONCE
Refills: 0 | Status: DISCONTINUED | OUTPATIENT
Start: 2024-06-06 | End: 2024-06-08

## 2024-06-06 RX ORDER — CEFEPIME 1 G/1
2000 INJECTION, POWDER, FOR SOLUTION INTRAMUSCULAR; INTRAVENOUS EVERY 8 HOURS
Refills: 0 | Status: DISCONTINUED | OUTPATIENT
Start: 2024-06-06 | End: 2024-06-06

## 2024-06-06 RX ORDER — INSULIN LISPRO 100/ML
3 VIAL (ML) SUBCUTANEOUS
Refills: 0 | Status: DISCONTINUED | OUTPATIENT
Start: 2024-06-06 | End: 2024-06-07

## 2024-06-06 RX ORDER — GLUCAGON INJECTION, SOLUTION 0.5 MG/.1ML
1 INJECTION, SOLUTION SUBCUTANEOUS ONCE
Refills: 0 | Status: DISCONTINUED | OUTPATIENT
Start: 2024-06-06 | End: 2024-06-08

## 2024-06-06 RX ORDER — ONDANSETRON 8 MG/1
4 TABLET, FILM COATED ORAL EVERY 8 HOURS
Refills: 0 | Status: DISCONTINUED | OUTPATIENT
Start: 2024-06-06 | End: 2024-06-08

## 2024-06-06 RX ADMIN — APIXABAN 5 MILLIGRAM(S): 2.5 TABLET, FILM COATED ORAL at 17:02

## 2024-06-06 RX ADMIN — Medication 500 MILLIGRAM(S): at 14:36

## 2024-06-06 RX ADMIN — Medication 500 MILLIGRAM(S): at 05:51

## 2024-06-06 RX ADMIN — CEFEPIME 100 MILLIGRAM(S): 1 INJECTION, POWDER, FOR SOLUTION INTRAMUSCULAR; INTRAVENOUS at 14:36

## 2024-06-06 RX ADMIN — APIXABAN 5 MILLIGRAM(S): 2.5 TABLET, FILM COATED ORAL at 05:50

## 2024-06-06 RX ADMIN — Medication 3 UNIT(S): at 16:21

## 2024-06-06 RX ADMIN — SODIUM CHLORIDE 75 MILLILITER(S): 9 INJECTION, SOLUTION INTRAVENOUS at 01:47

## 2024-06-06 RX ADMIN — Medication 500 MILLIGRAM(S): at 21:04

## 2024-06-06 RX ADMIN — Medication 250 MILLIGRAM(S): at 17:01

## 2024-06-06 RX ADMIN — CEFEPIME 100 MILLIGRAM(S): 1 INJECTION, POWDER, FOR SOLUTION INTRAMUSCULAR; INTRAVENOUS at 05:50

## 2024-06-06 RX ADMIN — SODIUM CHLORIDE 75 MILLILITER(S): 9 INJECTION, SOLUTION INTRAVENOUS at 17:01

## 2024-06-06 RX ADMIN — Medication 8: at 08:10

## 2024-06-06 RX ADMIN — CEFTRIAXONE 100 MILLIGRAM(S): 500 INJECTION, POWDER, FOR SOLUTION INTRAMUSCULAR; INTRAVENOUS at 23:09

## 2024-06-06 RX ADMIN — Medication 250 MILLIGRAM(S): at 05:50

## 2024-06-06 RX ADMIN — INSULIN GLARGINE 15 UNIT(S): 100 INJECTION, SOLUTION SUBCUTANEOUS at 21:04

## 2024-06-06 RX ADMIN — Medication 6: at 16:21

## 2024-06-06 RX ADMIN — Medication 6: at 11:34

## 2024-06-06 RX ADMIN — Medication 3 UNIT(S): at 11:34

## 2024-06-06 NOTE — CONSULT NOTE ADULT - SUBJECTIVE AND OBJECTIVE BOX
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 Patient reports he began having pain and felt a bump on his buttock on Sunday. Gradually worsened and got larger. Denies any drainage. Denies fevers or chills at home. Has never had an abscess previously.     In ED vitals were  T(F): 98.7  HR: 114  BP: 134/66  RR: 18  SpO2: 98%    Labs were remarkable for leukocytosis WBC 12.74, Hgb 13.6, MCV 77.9, lactate 2.5 --> 2.3, AG 15,  HgbA1C 11.5 ( from 3/2024)     CTAP:   Abscess along the left medial gluteal fold measuring 6.1 x 3.9 cm    Pt was evaluated by Surgery in ED who performed bedside I&D with about 30 cc of puss removal. Pt was given Rocephin, Vanc, and flagyl and 2.8 L IVF in ED. He also received 8 units of IV regular insulin. He is being admitted to medicine for further workup.      (06 Jun 2024 00:48)      INFECTIOUS DISEASE HISTORY:  History as above.    PAST MEDICAL & SURGICAL HISTORY:  Diabetes      History of back surgery          FAMILY HISTORY  Family history reviewed and non-contributory      SOCIAL HISTORY  Social History:  , , Lives At Home, Sexually Active with wife (27 Mar 2024 17:00)        ROS  General: Denies rigors, nightsweats  HEENT: Denies headache, rhinorrhea, sore throat, eye pain  CV: Denies CP, palpitations  PULM: Denies wheezing, hemoptysis  GI: Denies hematemesis, hematochezia, melena  : Denies discharge, hematuria  MSK: Denies arthralgias, myalgias  SKIN: Denies rash, lesions  NEURO: Denies paresthesias, weakness  PSYCH: Denies depression, anxiety    VITALS:  T(F): 100.2, Max: 101.2 (06-05-24 @ 20:52)  HR: 79  BP: 172/93  RR: 18Vital Signs Last 24 Hrs  T(C): 37.9 (06 Jun 2024 07:40), Max: 38.4 (05 Jun 2024 20:52)  T(F): 100.2 (06 Jun 2024 07:40), Max: 101.2 (05 Jun 2024 20:52)  HR: 79 (06 Jun 2024 11:53) (79 - 93)  BP: 172/93 (06 Jun 2024 11:53) (146/82 - 172/93)  BP(mean): --  RR: 18 (06 Jun 2024 11:53) (17 - 18)  SpO2: 97% (06 Jun 2024 11:53) (97% - 98%)    Parameters below as of 06 Jun 2024 11:53  Patient On (Oxygen Delivery Method): room air        PHYSICAL EXAM:  Gen: NAD, resting in bed  HEENT: Normocephalic, atraumatic  Neck: supple, no lymphadenopathy  CV: Regular rate & regular rhythm  Lungs: decreased BS at bases, no fremitus  Abdomen: Soft, BS present  Ext: Warm, well perfused  Neuro: non focal, awake  Skin: no rash, no erythema  Lines: no phlebitis    TESTS & MEASUREMENTS:                        13.3   10.96 )-----------( 278      ( 06 Jun 2024 07:05 )             39.1     06-06    133<L>  |  96<L>  |  11  ----------------------------<  281<H>  4.1   |  26  |  1.0    Ca    9.0      06 Jun 2024 07:05    TPro  7.2  /  Alb  4.5  /  TBili  0.5  /  DBili  x   /  AST  13  /  ALT  15  /  AlkPhos  103  06-05      LIVER FUNCTIONS - ( 05 Jun 2024 17:01 )  Alb: 4.5 g/dL / Pro: 7.2 g/dL / ALK PHOS: 103 U/L / ALT: 15 U/L / AST: 13 U/L / GGT: x           Urinalysis Basic - ( 06 Jun 2024 07:05 )    Color: x / Appearance: x / SG: x / pH: x  Gluc: 281 mg/dL / Ketone: x  / Bili: x / Urobili: x   Blood: x / Protein: x / Nitrite: x   Leuk Esterase: x / RBC: x / WBC x   Sq Epi: x / Non Sq Epi: x / Bacteria: x        Culture - Abscess with Gram Stain (collected 06-05-24 @ 22:24)  Source: .Abscess perianal  Gram Stain (06-06-24 @ 11:28):    Numerous polymorphonuclear leukocytes seen per low power field    Moderate Gram positive cocci in pairs seen per oil power field    Moderate Gram Negative Rods seen per oil power field        Lactate, Blood: 1.3 mmol/L (06-06-24 @ 07:05)  Lactate, Blood: 2.3 mmol/L (06-05-24 @ 18:56)  Lactate, Blood: 2.5 mmol/L (06-05-24 @ 17:01)      INFECTIOUS DISEASES TESTING      RADIOLOGY & ADDITIONAL TESTS:  I have personally reviewed the last Chest xray  CXR      CT  CT Abdomen and Pelvis w/ IV Cont:   ACC: [id number] EXAM:  CT ABDOMEN AND PELVIS IC   ORDERED BY: MEMO ESPARZA     PROCEDURE DATE:  06/05/2024          INTERPRETATION:  CLINICAL HISTORY / REASON FOR EXAM: Painful bump on   buttock. WBC is 12.74    TECHNIQUE: Contiguous axial CT imageswere obtained from the lower chest   to the pubic symphysis following administration of 95 mL Omnipaque 350   intravenous contrast, 5 mL discarded . Oral contrast was not   administered. Coronal and sagittal reformats were submitted for review.    COMPARISON CT: No studies are available for direct comparison.    FINDINGS:    LOWER CHEST/HEART: Unremarkable.    HEPATOBILIARY: No biliary ductal dilatation. No radiopaque gallstones.    SPLEEN: Unremarkable.    PANCREAS: Unremarkable.    ADRENAL GLANDS: Unremarkable.    KIDNEYS: Symmetric renal enhancement. No hydronephrosis.    ABDOMINOPELVIC NODES: No lymphadenopathy by size criteria.    PELVIC ORGANS: Unremarkable.    PERITONEUM/MESENTERY/BOWEL: Postsurgical changes along the greater   curvature the stomach. No pneumoperitoneum. No obstruction or ascites.   Unremarkable appendix.    BONES/SOFT TISSUES: Abscess along the left medial gluteal fold measuring   6.1 x 3.9 cm. Bone island on the left femoral head. Surgical hardware   L5-S1. Mild bilateral gynecomastia.    VASCULAR: Normal caliber abdominal aorta.      IMPRESSION:    Abscess along the left medial gluteal fold measuring 6.1 x 3.9 cm.    --- End of Report ---          SHELL ROSSI MD; Resident Radiologist  This document hasbeen electronically signed.  JESSE HARTLEY MD; Attending Radiologist  This document has been electronically signed. Jun 5 2024  7:59PM (06-05-24 @ 17:28)      CARDIOLOGY TESTING  12 Lead ECG:   Ventricular Rate 100 BPM    Atrial Rate 100 BPM    P-R Interval 136 ms    QRS Duration 86 ms    Q-T Interval 356 ms    QTC Calculation(Bazett) 459 ms    P Axis 71 degrees    R Axis 87 degrees    T Axis -9 degrees    Diagnosis Line Normal sinus rhythm  Nonspecific ST-T changes  Confirmed by ANA PETER MD ([id number]) on 6/6/2024 6:56:17 AM (06-05-24 @ 17:32)      MEDICATIONS  apixaban 5 Oral every 12 hours  cefepime   IVPB 2000 IV Intermittent every 8 hours  dextrose 10% Bolus 125 IV Bolus once  dextrose 10% Bolus 125 IV Bolus once  dextrose 5%. 1000 IV Continuous <Continuous>  dextrose 5%. 1000 IV Continuous <Continuous>  dextrose 5%. 1000 IV Continuous <Continuous>  dextrose 50% Injectable 25 IV Push once  dextrose 50% Injectable 12.5 IV Push once  dextrose 50% Injectable 25 IV Push once  glucagon  Injectable 1 IntraMuscular once  insulin glargine Injectable (LANTUS) 15 SubCutaneous at bedtime  insulin lispro (ADMELOG) corrective regimen sliding scale  SubCutaneous three times a day before meals  insulin lispro Injectable (ADMELOG) 3 SubCutaneous three times a day before meals  lactated ringers. 1000 IV Continuous <Continuous>  metroNIDAZOLE    Tablet 500 Oral every 8 hours  vancomycin  IVPB 1750 IV Intermittent every 12 hours      Weight  Weight (kg): 117.934 (06-05-24 @ 16:45)    ANTIBIOTICS:  cefepime   IVPB 2000 milliGRAM(s) IV Intermittent every 8 hours  metroNIDAZOLE    Tablet 500 milliGRAM(s) Oral every 8 hours  vancomycin  IVPB 1750 milliGRAM(s) IV Intermittent every 12 hours      ALLERGIES:  No Known Allergies         RASHAAD CORDERO  38y, Male  Allergy: No Known Allergies      CHIEF COMPLAINT: abscess (06 Jun 2024 00:48)      LOS  1d    HPI:  38y Male w/ PMHx of DM, herniated discs s/p L5-S1 fusion (9/2023), 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 Patient reports he began having pain and felt a bump on his buttock on Sunday. Gradually worsened and got larger. Denies any drainage. Denies fevers or chills at home. Has never had an abscess previously.     In ED vitals were  T(F): 98.7  HR: 114  BP: 134/66  RR: 18  SpO2: 98%    Labs were remarkable for leukocytosis WBC 12.74, Hgb 13.6, MCV 77.9, lactate 2.5 --> 2.3, AG 15,  HgbA1C 11.5 ( from 3/2024)     CTAP:   Abscess along the left medial gluteal fold measuring 6.1 x 3.9 cm    Pt was evaluated by Surgery in ED who performed bedside I&D with about 30 cc of puss removal. Pt was given Rocephin, Vanc, and flagyl and 2.8 L IVF in ED. He also received 8 units of IV regular insulin. He is being admitted to medicine for further workup.      (06 Jun 2024 00:48)      INFECTIOUS DISEASE HISTORY:  History as above.    PAST MEDICAL & SURGICAL HISTORY:  Diabetes      History of back surgery          FAMILY HISTORY  Family history reviewed and non-contributory      SOCIAL HISTORY  Social History:  , , Lives At Home, Sexually Active with wife (27 Mar 2024 17:00)        ROS  General: Denies rigors, nightsweats  HEENT: Denies headache, rhinorrhea, sore throat, eye pain  CV: Denies CP, palpitations  PULM: Denies wheezing, hemoptysis  GI: Denies hematemesis, hematochezia, melena  : Denies discharge, hematuria  MSK: Denies arthralgias, myalgias  SKIN: Denies rash, lesions  NEURO: Denies paresthesias, weakness  PSYCH: Denies depression, anxiety    VITALS:  T(F): 100.2, Max: 101.2 (06-05-24 @ 20:52)  HR: 79  BP: 172/93  RR: 18Vital Signs Last 24 Hrs  T(C): 37.9 (06 Jun 2024 07:40), Max: 38.4 (05 Jun 2024 20:52)  T(F): 100.2 (06 Jun 2024 07:40), Max: 101.2 (05 Jun 2024 20:52)  HR: 79 (06 Jun 2024 11:53) (79 - 93)  BP: 172/93 (06 Jun 2024 11:53) (146/82 - 172/93)  BP(mean): --  RR: 18 (06 Jun 2024 11:53) (17 - 18)  SpO2: 97% (06 Jun 2024 11:53) (97% - 98%)    Parameters below as of 06 Jun 2024 11:53  Patient On (Oxygen Delivery Method): room air        PHYSICAL EXAM:  Gen: NAD, resting in bed  HEENT: Normocephalic, atraumatic  Neck: supple, no lymphadenopathy  CV: Regular rate & regular rhythm  Lungs: decreased BS at bases, no fremitus  Abdomen: Soft, BS present  Ext: Warm, well perfused  Neuro: non focal, awake  Skin: perirectal area with small area of induration -- oozing/bleeding -- mild tenderness  Lines: no phlebitis    TESTS & MEASUREMENTS:                        13.3   10.96 )-----------( 278      ( 06 Jun 2024 07:05 )             39.1     06-06    133<L>  |  96<L>  |  11  ----------------------------<  281<H>  4.1   |  26  |  1.0    Ca    9.0      06 Jun 2024 07:05    TPro  7.2  /  Alb  4.5  /  TBili  0.5  /  DBili  x   /  AST  13  /  ALT  15  /  AlkPhos  103  06-05      LIVER FUNCTIONS - ( 05 Jun 2024 17:01 )  Alb: 4.5 g/dL / Pro: 7.2 g/dL / ALK PHOS: 103 U/L / ALT: 15 U/L / AST: 13 U/L / GGT: x           Urinalysis Basic - ( 06 Jun 2024 07:05 )    Color: x / Appearance: x / SG: x / pH: x  Gluc: 281 mg/dL / Ketone: x  / Bili: x / Urobili: x   Blood: x / Protein: x / Nitrite: x   Leuk Esterase: x / RBC: x / WBC x   Sq Epi: x / Non Sq Epi: x / Bacteria: x        Culture - Abscess with Gram Stain (collected 06-05-24 @ 22:24)  Source: .Abscess perianal  Gram Stain (06-06-24 @ 11:28):    Numerous polymorphonuclear leukocytes seen per low power field    Moderate Gram positive cocci in pairs seen per oil power field    Moderate Gram Negative Rods seen per oil power field        Lactate, Blood: 1.3 mmol/L (06-06-24 @ 07:05)  Lactate, Blood: 2.3 mmol/L (06-05-24 @ 18:56)  Lactate, Blood: 2.5 mmol/L (06-05-24 @ 17:01)      INFECTIOUS DISEASES TESTING      RADIOLOGY & ADDITIONAL TESTS:  I have personally reviewed the last Chest xray  CXR      CT  CT Abdomen and Pelvis w/ IV Cont:   ACC: [id number] EXAM:  CT ABDOMEN AND PELVIS IC   ORDERED BY: MEMO ESPARZA     PROCEDURE DATE:  06/05/2024          INTERPRETATION:  CLINICAL HISTORY / REASON FOR EXAM: Painful bump on   buttock. WBC is 12.74    TECHNIQUE: Contiguous axial CT imageswere obtained from the lower chest   to the pubic symphysis following administration of 95 mL Omnipaque 350   intravenous contrast, 5 mL discarded . Oral contrast was not   administered. Coronal and sagittal reformats were submitted for review.    COMPARISON CT: No studies are available for direct comparison.    FINDINGS:    LOWER CHEST/HEART: Unremarkable.    HEPATOBILIARY: No biliary ductal dilatation. No radiopaque gallstones.    SPLEEN: Unremarkable.    PANCREAS: Unremarkable.    ADRENAL GLANDS: Unremarkable.    KIDNEYS: Symmetric renal enhancement. No hydronephrosis.    ABDOMINOPELVIC NODES: No lymphadenopathy by size criteria.    PELVIC ORGANS: Unremarkable.    PERITONEUM/MESENTERY/BOWEL: Postsurgical changes along the greater   curvature the stomach. No pneumoperitoneum. No obstruction or ascites.   Unremarkable appendix.    BONES/SOFT TISSUES: Abscess along the left medial gluteal fold measuring   6.1 x 3.9 cm. Bone island on the left femoral head. Surgical hardware   L5-S1. Mild bilateral gynecomastia.    VASCULAR: Normal caliber abdominal aorta.      IMPRESSION:    Abscess along the left medial gluteal fold measuring 6.1 x 3.9 cm.    --- End of Report ---          SHELL ROSSI MD; Resident Radiologist  This document hasbeen electronically signed.  JESSE HARTLEY MD; Attending Radiologist  This document has been electronically signed. Jun 5 2024  7:59PM (06-05-24 @ 17:28)      CARDIOLOGY TESTING  12 Lead ECG:   Ventricular Rate 100 BPM    Atrial Rate 100 BPM    P-R Interval 136 ms    QRS Duration 86 ms    Q-T Interval 356 ms    QTC Calculation(Bazett) 459 ms    P Axis 71 degrees    R Axis 87 degrees    T Axis -9 degrees    Diagnosis Line Normal sinus rhythm  Nonspecific ST-T changes  Confirmed by ANA PETER MD ([id number]) on 6/6/2024 6:56:17 AM (06-05-24 @ 17:32)      MEDICATIONS  apixaban 5 Oral every 12 hours  cefepime   IVPB 2000 IV Intermittent every 8 hours  dextrose 10% Bolus 125 IV Bolus once  dextrose 10% Bolus 125 IV Bolus once  dextrose 5%. 1000 IV Continuous <Continuous>  dextrose 5%. 1000 IV Continuous <Continuous>  dextrose 5%. 1000 IV Continuous <Continuous>  dextrose 50% Injectable 25 IV Push once  dextrose 50% Injectable 12.5 IV Push once  dextrose 50% Injectable 25 IV Push once  glucagon  Injectable 1 IntraMuscular once  insulin glargine Injectable (LANTUS) 15 SubCutaneous at bedtime  insulin lispro (ADMELOG) corrective regimen sliding scale  SubCutaneous three times a day before meals  insulin lispro Injectable (ADMELOG) 3 SubCutaneous three times a day before meals  lactated ringers. 1000 IV Continuous <Continuous>  metroNIDAZOLE    Tablet 500 Oral every 8 hours  vancomycin  IVPB 1750 IV Intermittent every 12 hours      Weight  Weight (kg): 117.934 (06-05-24 @ 16:45)    ANTIBIOTICS:  cefepime   IVPB 2000 milliGRAM(s) IV Intermittent every 8 hours  metroNIDAZOLE    Tablet 500 milliGRAM(s) Oral every 8 hours  vancomycin  IVPB 1750 milliGRAM(s) IV Intermittent every 12 hours      ALLERGIES:  No Known Allergies

## 2024-06-06 NOTE — H&P ADULT - HISTORY OF PRESENT ILLNESS
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 Patient reports he began having pain and felt a bump on his buttock on Sunday. Gradually worsened and got larger. Denies any drainage. Denies fevers or chills at home. Has never had an abscess previously.     In ED vitals were  T(F): 98.7  HR: 114  BP: 134/66  RR: 18  SpO2: 98%    Labs were remarkable for leukocytosis WBC 12.74, Hgb 13.6, MCV 77.9, lactate 2.5 --> 2.3, AG 15,  HgbA1C 11.5 ( from 3/2024)     CTAP:   Abscess along the left medial gluteal fold measuring 6.1 x 3.9 cm    Pt was evaluated by Surgery in ED who performed bedside I&D with about 30 cc of puss removal. Pt was given Rocephin, Vanc, and flagyl and 2.8 L IVF in ED. He also received 8 units of IV regular insulin. He is being admitted to medicine for further workup.

## 2024-06-06 NOTE — H&P ADULT - NSHPPHYSICALEXAM_GEN_ALL_CORE
T(C): 37.1 (06-05-24 @ 23:54), Max: 38.4 (06-05-24 @ 20:52)  HR: 79 (06-05-24 @ 23:54) (79 - 114)  BP: 146/82 (06-05-24 @ 23:54) (134/66 - 166/91)  RR: 17 (06-05-24 @ 23:54) (17 - 18)  SpO2: 98% (06-05-24 @ 23:54) (98% - 99%)    CONSTITUTIONAL: Well groomed, no apparent distress    EYES: PERRLA and symmetric, EOMI, No conjunctival or scleral injection, non-icteric    ENMT: Oral mucosa with moist membranes. No external nasal lesions; nasal mucosa not inflamed; normal dentition; no pharyngeal injection or exudates  	  NECK: Supple, symmetric and without tracheal deviation    RESPIRATORY: No respiratory distress, no use of accessory muscles; CTA b/l, no wheezes    CARDIOVASCULAR: RRRR, +S1S2, no murmurs, no rubs, no gallops    GASTROINTESTINAL: Soft, non tender, non distended, no rebound, no guarding    MUSCULOSKELETAL:  no digital clubbing or cyanosis; examination of the (head/neck, spine/ribs/pelvis, RUE, LUE, RLE, LLE) without misalignment, normal range of motion without pain    SKIN: erythematous, fluctuant, indurated left perianal abscess s/p I&D with packing material inside     NEUROLOGIC: no focal deficit noted     PSYCHIATRIC: Appropriate insight/judgment; A+O x 3, mood and affect appropriate, recent/remote memory intact

## 2024-06-06 NOTE — CONSULT NOTE ADULT - ASSESSMENT
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 Patient reports he began having pain and felt a bump on his buttock on Sunday. Gradually worsened and got larger. Denies any drainage. Denies fevers or chills at home. Has never had an abscess previously.     IMPRESSION  #Perianal abscess  - CT Abdomen and Pelvis w/ IV Cont (06.05.24 @ 17:28): Abscess along the left medial gluteal fold measuring 6.1 x 3.9 cm.      #Herniated disk s/p L5-S1 fusion (9/2023)   #Hx of DVT/PE     #Obesity BMI (kg/m2): 30.8  #Abx allergy: No Known Allergies    RECOMMENDATIONS  - continue vancomycin 1750 mg q 12 hours   - narrow cefepime to ceftriaxone 2g daily   - continue flagyl 500 mg q 8 hours PO   - follow-up I and D Cx     Please call or message on [employer] Teams if with any questions.  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 Patient reports he began having pain and felt a bump on his buttock on Sunday. Gradually worsened and got larger. Denies any drainage. Denies fevers or chills at home. Has never had an abscess previously.     IMPRESSION  #Perianal abscess  - CT Abdomen and Pelvis w/ IV Cont (06.05.24 @ 17:28): Abscess along the left medial gluteal fold measuring 6.1 x 3.9 cm.      #Herniated disk s/p L5-S1 fusion (9/2023)   #Hx of DVT/PE     #Obesity BMI (kg/m2): 30.8  #Abx allergy: No Known Allergies    RECOMMENDATIONS  - continue vancomycin 1750 mg q 12 hours while awaiting Cx  - narrow cefepime to ceftriaxone 2g daily   - continue flagyl 500 mg q 8 hours PO   - follow-up I and D Cx -- final PO regimen pending Cx data   - local wound care by surgery     Please call or message on [employer] Teams if with any questions.  Spectra [phone]

## 2024-06-06 NOTE — H&P ADULT - NSVTERISKASSESS_GEN_ALL_CORE FT
Medical Assessment Completed on: 06-Jun-2024 00:59 Itraconazole Counseling:  I discussed with the patient the risks of itraconazole including but not limited to liver damage, nausea/vomiting, neuropathy, and severe allergy.  The patient understands that this medication is best absorbed when taken with acidic beverages such as non-diet cola or ginger ale.  The patient understands that monitoring is required including baseline LFTs and repeat LFTs at intervals.  The patient understands that they are to contact us or the primary physician if concerning signs are noted.

## 2024-06-06 NOTE — H&P ADULT - NSHPLABSRESULTS_GEN_ALL_CORE
13.6   12.74 )-----------( 266      ( 05 Jun 2024 17:01 )             40.1     06-05    131<L>  |  93<L>  |  17  ----------------------------<  376<H>  4.1   |  23  |  1.1    Ca    9.1      05 Jun 2024 17:01    TPro  7.2  /  Alb  4.5  /  TBili  0.5  /  DBili  x   /  AST  13  /  ALT  15  /  AlkPhos  103  06-05    PT/INR - ( 05 Jun 2024 17:01 )   PT: 12.70 sec;   INR: 1.11 ratio         PTT - ( 05 Jun 2024 17:01 )  PTT:31.7 sec      LIVER FUNCTIONS - ( 05 Jun 2024 17:01 )  Alb: 4.5 g/dL / Pro: 7.2 g/dL / ALK PHOS: 103 U/L / ALT: 15 U/L / AST: 13 U/L / GGT: x           Urinalysis Basic - ( 05 Jun 2024 17:01 )    Color: x / Appearance: x / SG: x / pH: x  Gluc: 376 mg/dL / Ketone: x  / Bili: x / Urobili: x   Blood: x / Protein: x / Nitrite: x   Leuk Esterase: x / RBC: x / WBC x   Sq Epi: x / Non Sq Epi: x / Bacteria: x    RADIOLOGY & ADDITIONAL STUDIES:     < from: CT Abdomen and Pelvis w/ IV Cont (06.05.24 @ 17:28) >    IMPRESSION:    Abscess along the left medial gluteal fold measuring 6.1 x 3.9 cm.    --- End of Report ---      < end of copied text >

## 2024-06-06 NOTE — PHARMACOTHERAPY INTERVENTION NOTE - COMMENTS
Recommend to change intravenous metronidazole 500 mg q8h to oral metronidazole 500 mg q8h since patient is clinically stable and tolerating oral medications.    Edna Smith, PharmD  Clinical Pharmacy Specialist, Infectious Diseases  Tele-Antimicrobial Stewardship Program (Tele-[facility])  Tele-[facility] Phone: [phone]

## 2024-06-06 NOTE — H&P ADULT - ASSESSMENT
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 Patient reports he began having pain and felt a bump on his buttock on Sunday. Gradually worsened and got larger. Denies any drainage. Denies fevers or chills at home. Has never had an abscess previously.     PLAN:     #Severe sepsis on admission likely 2/2 abscess ( 2/4 SIRS + Source + elevated lactate)   #L sided perianal abscess   - s/p I&D with surgery with 30 cc puss drained  - surgery recommendations noted  - s/p 2.8 cc IVF  - s/p Rocephin + Vanc + Flagyl  - cultures sent  - c/w Vanc + Cefepime + Flagyl for now     #DM with hyperglycemia   - A1C 11.1 in 3/2024   - s/p 8 units of IV regular insulin in ED   - Consistent Carbs diet   - hold home anti-diabetic agents  - start SS ( If FS uncontrolled consider starting basal insulin in AM)     #Herniated discs s/p L5-S1 fusion in 9/2023   - Outpatient PT/OT    #DVT/PE in 3/2024   - c/w Eliquis     #DVT PPx: c/w Eliquis  #Diet: Consistent Carbs  #Activity: IAT  #GI PPx: PPI

## 2024-06-06 NOTE — H&P ADULT - NSHPREVIEWOFSYSTEMS_GEN_ALL_CORE
CONSTITUTIONAL: No weakness, fevers or chills  EYES/ENT: No visual changes;  No vertigo or throat pain   NECK: No pain or stiffness  RESPIRATORY: No cough, wheezing, hemoptysis; No shortness of breath  CARDIOVASCULAR: No chest pain or palpitations  GASTROINTESTINAL: No abdominal or epigastric pain. No nausea, vomiting, or hematemesis; No diarrhea or constipation. No melena or hematochezia.  GENITOURINARY: No dysuria, frequency or hematuria  NEUROLOGICAL: No numbness or weakness  SKIN: Per HPI

## 2024-06-06 NOTE — H&P ADULT - ATTENDING COMMENTS
38 yr old male presented with gluteal abscess.  VITAL SIGNS (Last 24 hrs):  T(C): 37.9 (06-06-24 @ 07:40), Max: 38.4 (06-05-24 @ 20:52)  HR: 79 (06-06-24 @ 11:53) (79 - 114)  BP: 172/93 (06-06-24 @ 11:53) (134/66 - 172/93)  RR: 18 (06-06-24 @ 11:53) (17 - 18)  SpO2: 97% (06-06-24 @ 11:53) (97% - 99%)  Wt(kg): --  Daily     Daily     I&O's Summary    05 Jun 2024 07:01  -  06 Jun 2024 07:00  --------------------------------------------------------  IN: 450 mL / OUT: 0 mL / NET: 450 mL    06 Jun 2024 07:01  -  06 Jun 2024 14:12  --------------------------------------------------------  IN: 300 mL / OUT: 0 mL / NET: 300 mL                          13.3   10.96 )-----------( 278      ( 06 Jun 2024 07:05 )             39.1   06-06    133<L>  |  96<L>  |  11  ----------------------------<  281<H>  4.1   |  26  |  1.0    Ca    9.0      06 Jun 2024 07:05    TPro  7.2  /  Alb  4.5  /  TBili  0.5  /  DBili  x   /  AST  13  /  ALT  15  /  AlkPhos  103  06-05  o/e  [id number]  chest--b/l air entry  cvs--s1s2n   abd/Gi--soft, bs+  gluteal abscess packed  no edema  Assessment and plan:  # Gluteal abscess-- s/p I&D-- abscess cx shows gm positive cocci and gm negative rods   full sen  is pending -- patient on cefepime vanco and flagyl  # DM uncontrolled Hba1c is 11.2-- started on insulin-- diabetic teaching today  # hx of DVT/PE-- continue eliquis  #Spinal fusion L5 S1 after a fall-- was  a  now on disability due to fall.

## 2024-06-07 ENCOUNTER — TRANSCRIPTION ENCOUNTER (OUTPATIENT)
Age: 39
End: 2024-06-07

## 2024-06-07 LAB
A1C WITH ESTIMATED AVERAGE GLUCOSE RESULT: 10.5 % — HIGH (ref 4–5.6)
ANION GAP SERPL CALC-SCNC: 13 MMOL/L — SIGNIFICANT CHANGE UP (ref 7–14)
BUN SERPL-MCNC: 9 MG/DL — LOW (ref 10–20)
CALCIUM SERPL-MCNC: 9.2 MG/DL — SIGNIFICANT CHANGE UP (ref 8.4–10.5)
CHLORIDE SERPL-SCNC: 97 MMOL/L — LOW (ref 98–110)
CHOLEST SERPL-MCNC: 166 MG/DL — SIGNIFICANT CHANGE UP
CO2 SERPL-SCNC: 27 MMOL/L — SIGNIFICANT CHANGE UP (ref 17–32)
CREAT SERPL-MCNC: 1.1 MG/DL — SIGNIFICANT CHANGE UP (ref 0.7–1.5)
EGFR: 88 ML/MIN/1.73M2 — SIGNIFICANT CHANGE UP
ESTIMATED AVERAGE GLUCOSE: 255 MG/DL — HIGH (ref 68–114)
GLUCOSE BLDC GLUCOMTR-MCNC: 211 MG/DL — HIGH (ref 70–99)
GLUCOSE BLDC GLUCOMTR-MCNC: 270 MG/DL — HIGH (ref 70–99)
GLUCOSE BLDC GLUCOMTR-MCNC: 283 MG/DL — HIGH (ref 70–99)
GLUCOSE BLDC GLUCOMTR-MCNC: 71 MG/DL — SIGNIFICANT CHANGE UP (ref 70–99)
GLUCOSE SERPL-MCNC: 264 MG/DL — HIGH (ref 70–99)
HCT VFR BLD CALC: 38 % — LOW (ref 42–52)
HDLC SERPL-MCNC: 43 MG/DL — SIGNIFICANT CHANGE UP
HGB BLD-MCNC: 13.1 G/DL — LOW (ref 14–18)
LIPID PNL WITH DIRECT LDL SERPL: 104 MG/DL — HIGH
MAGNESIUM SERPL-MCNC: 2.2 MG/DL — SIGNIFICANT CHANGE UP (ref 1.8–2.4)
MCHC RBC-ENTMCNC: 26.4 PG — LOW (ref 27–31)
MCHC RBC-ENTMCNC: 34.5 G/DL — SIGNIFICANT CHANGE UP (ref 32–37)
MCV RBC AUTO: 76.5 FL — LOW (ref 80–94)
NON HDL CHOLESTEROL: 123 MG/DL — SIGNIFICANT CHANGE UP
NRBC # BLD: 0 /100 WBCS — SIGNIFICANT CHANGE UP (ref 0–0)
PLATELET # BLD AUTO: 307 K/UL — SIGNIFICANT CHANGE UP (ref 130–400)
PMV BLD: 9.1 FL — SIGNIFICANT CHANGE UP (ref 7.4–10.4)
POTASSIUM SERPL-MCNC: 4.1 MMOL/L — SIGNIFICANT CHANGE UP (ref 3.5–5)
POTASSIUM SERPL-SCNC: 4.1 MMOL/L — SIGNIFICANT CHANGE UP (ref 3.5–5)
RBC # BLD: 4.97 M/UL — SIGNIFICANT CHANGE UP (ref 4.7–6.1)
RBC # FLD: 14.7 % — HIGH (ref 11.5–14.5)
SODIUM SERPL-SCNC: 137 MMOL/L — SIGNIFICANT CHANGE UP (ref 135–146)
T4 FREE SERPL-MCNC: 1.2 NG/DL — SIGNIFICANT CHANGE UP (ref 0.9–1.8)
TRIGL SERPL-MCNC: 97 MG/DL — SIGNIFICANT CHANGE UP
TSH SERPL-MCNC: 4.7 UIU/ML — HIGH (ref 0.27–4.2)
VANCOMYCIN TROUGH SERPL-MCNC: 10.9 UG/ML — HIGH (ref 5–10)
WBC # BLD: 9.76 K/UL — SIGNIFICANT CHANGE UP (ref 4.8–10.8)
WBC # FLD AUTO: 9.76 K/UL — SIGNIFICANT CHANGE UP (ref 4.8–10.8)

## 2024-06-07 PROCEDURE — 99232 SBSQ HOSP IP/OBS MODERATE 35: CPT

## 2024-06-07 PROCEDURE — 99221 1ST HOSP IP/OBS SF/LOW 40: CPT

## 2024-06-07 RX ORDER — ATORVASTATIN CALCIUM 80 MG/1
40 TABLET, FILM COATED ORAL AT BEDTIME
Refills: 0 | Status: DISCONTINUED | OUTPATIENT
Start: 2024-06-07 | End: 2024-06-08

## 2024-06-07 RX ORDER — INSULIN GLARGINE 100 [IU]/ML
22 INJECTION, SOLUTION SUBCUTANEOUS AT BEDTIME
Refills: 0 | Status: DISCONTINUED | OUTPATIENT
Start: 2024-06-07 | End: 2024-06-08

## 2024-06-07 RX ORDER — INSULIN GLARGINE 100 [IU]/ML
18 INJECTION, SOLUTION SUBCUTANEOUS AT BEDTIME
Refills: 0 | Status: DISCONTINUED | OUTPATIENT
Start: 2024-06-07 | End: 2024-06-07

## 2024-06-07 RX ORDER — INSULIN LISPRO 100/ML
8 VIAL (ML) SUBCUTANEOUS
Refills: 0 | Status: DISCONTINUED | OUTPATIENT
Start: 2024-06-07 | End: 2024-06-08

## 2024-06-07 RX ORDER — LISINOPRIL 2.5 MG/1
5 TABLET ORAL DAILY
Refills: 0 | Status: DISCONTINUED | OUTPATIENT
Start: 2024-06-07 | End: 2024-06-08

## 2024-06-07 RX ORDER — INSULIN LISPRO 100/ML
5 VIAL (ML) SUBCUTANEOUS
Refills: 0 | Status: DISCONTINUED | OUTPATIENT
Start: 2024-06-07 | End: 2024-06-07

## 2024-06-07 RX ADMIN — Medication 250 MILLIGRAM(S): at 05:43

## 2024-06-07 RX ADMIN — Medication 8 UNIT(S): at 16:32

## 2024-06-07 RX ADMIN — Medication 5 UNIT(S): at 12:09

## 2024-06-07 RX ADMIN — Medication 6: at 08:06

## 2024-06-07 RX ADMIN — Medication 500 MILLIGRAM(S): at 13:13

## 2024-06-07 RX ADMIN — Medication 500 MILLIGRAM(S): at 05:43

## 2024-06-07 RX ADMIN — LISINOPRIL 5 MILLIGRAM(S): 2.5 TABLET ORAL at 09:17

## 2024-06-07 RX ADMIN — Medication 6: at 12:09

## 2024-06-07 RX ADMIN — APIXABAN 5 MILLIGRAM(S): 2.5 TABLET, FILM COATED ORAL at 17:20

## 2024-06-07 RX ADMIN — ATORVASTATIN CALCIUM 40 MILLIGRAM(S): 80 TABLET, FILM COATED ORAL at 21:52

## 2024-06-07 RX ADMIN — Medication 4: at 16:31

## 2024-06-07 RX ADMIN — APIXABAN 5 MILLIGRAM(S): 2.5 TABLET, FILM COATED ORAL at 05:43

## 2024-06-07 RX ADMIN — Medication 250 MILLIGRAM(S): at 17:20

## 2024-06-07 RX ADMIN — Medication 3 UNIT(S): at 08:06

## 2024-06-07 RX ADMIN — Medication 500 MILLIGRAM(S): at 21:52

## 2024-06-07 NOTE — PROGRESS NOTE ADULT - ASSESSMENT
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 Patient reports he began having pain and felt a bump on his buttock on Sunday. Gradually worsened and got larger. Denies any drainage. Denies fevers or chills at home. Has never had an abscess previously.     IMPRESSION  #Perianal abscess  - CT Abdomen and Pelvis w/ IV Cont (06.05.24 @ 17:28): Abscess along the left medial gluteal fold measuring 6.1 x 3.9 cm.  - s/p I and D 6/5     #Herniated disk s/p L5-S1 fusion (9/2023)   #Hx of DVT/PE     #Obesity BMI (kg/m2): 30.8  #Abx allergy: No Known Allergies    RECOMMENDATIONS  - if no other issues -- ok to plan to discharge with PO augmentin 875/125 mg BID (end date 6/15) -- will follow-up Cx and make adjustments as outpatient     Please call or message on [employer] Teams if with any questions.  Spectra [phone]

## 2024-06-07 NOTE — DISCHARGE NOTE PROVIDER - NSDCCPCAREPLAN_GEN_ALL_CORE_FT
PRINCIPAL DISCHARGE DIAGNOSIS  Diagnosis: Abscess of buttock  Assessment and Plan of Treatment: You came to the hospital   Sepsis is a serious condition that occurs when the body overreacts to an infection. It is also called systemic inflammatory response syndrome (SIRS) with infection. An infection is usually caused by bacteria that attack the body. The body's defense system normally fights off infection within the affected body part. With sepsis, the body overreacts and causes symptoms to occur throughout the body. This leads to uncontrolled and widespread inflammation and clotting in small blood vessels. Blood flow to different body parts decreases and may lead to organ failure. Sepsis requires immediate treatment.  You were treated in the hospital with IV antibiotics, and your symptoms resolved.   Please seek immediate medical attention if you develop fever >100.4 F, feel dizzy, have nausea or vomiting, coughing blood, have sudden trouble breathing or chest pain, severe weakness, or your skin or lips are turning blue.       PRINCIPAL DISCHARGE DIAGNOSIS  Diagnosis: Abscess of buttock  Assessment and Plan of Treatment: You came to the hospital for abscess of buttock. You were treated with IV antibiotics and are being sent home with oral antibiotics. Please take these medications as prescribed.  For your diabetes, please take your medications as prescribed and follow up with an internal medicine physician (if you don't already have a primary care provider you follow up with) as well as an Endocrinologist. Phone numbers provided in this discharge summary.   Sepsis is a serious condition that occurs when the body overreacts to an infection. It is also called systemic inflammatory response syndrome (SIRS) with infection. An infection is usually caused by bacteria that attack the body. The body's defense system normally fights off infection within the affected body part. With sepsis, the body overreacts and causes symptoms to occur throughout the body. This leads to uncontrolled and widespread inflammation and clotting in small blood vessels. Blood flow to different body parts decreases and may lead to organ failure. Sepsis requires immediate treatment.  You were treated in the hospital with IV antibiotics, and your symptoms resolved.   Please seek immediate medical attention if you develop fever >100.4 F, feel dizzy, have nausea or vomiting, coughing blood, have sudden trouble breathing or chest pain, severe weakness, or your skin or lips are turning blue.

## 2024-06-07 NOTE — CONSULT NOTE ADULT - ASSESSMENT
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 Endocrinology consulted for low TSH.    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 Endocrinology consulted for low TSH.    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 Endocrinology consulted for low TSH.    Impression:  #Low TSH  #Uncontrolled DM  - previous TSH 0 (3/27/24), no clinical/overt signs of hyperthyroidism  - A1c 9.7 this admission (6/6/24), previously 11.5% (3/27/24)  - home meds: metformin 500mg BID  - -300s  - inpatient diabetes regimen: lispro 3 units TID --> increased to 5 units, lantus 15 units qhs --> increased to 18 units, sliding scale    Recommendations:  - recommend increasing lispro to 8 units, lantus to 22 units  - on discharge, resume metformin, add glimiperide 2mg BID

## 2024-06-07 NOTE — CONSULT NOTE ADULT - SUBJECTIVE AND OBJECTIVE BOX
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 Patient reports he began having pain and felt a bump on his buttock on Sunday. Gradually worsened and got larger. Denies any drainage. Denies fevers or chills at home. Has never had an abscess previously.     In ED vitals were  T(F): 98.7  HR: 114  BP: 134/66  RR: 18  SpO2: 98%    Labs were remarkable for leukocytosis WBC 12.74, Hgb 13.6, MCV 77.9, lactate 2.5 --> 2.3, AG 15,  HgbA1C 11.5 ( from 3/2024)     CTAP:   Abscess along the left medial gluteal fold measuring 6.1 x 3.9 cm    Pt was evaluated by Surgery in ED who performed bedside I&D with about 30 cc of puss removal. Pt was given Rocephin, Vanc, and flagyl and 2.8 L IVF in ED. He also received 8 units of IV regular insulin. He is being admitted to medicine for further workup.      (06 Jun 2024 00:48)      PAST MEDICAL & SURGICAL HISTORY  Diabetes    History of back surgery        FAMILY HISTORY:  FAMILY HISTORY:      SOCIAL HISTORY:  []smoker  []Alcohol  []Drug    ALLERGIES:  No Known Allergies      MEDICATIONS:  MEDICATIONS  (STANDING):  apixaban 5 milliGRAM(s) Oral every 12 hours  atorvastatin 40 milliGRAM(s) Oral at bedtime  cefTRIAXone   IVPB 2000 milliGRAM(s) IV Intermittent every 24 hours  dextrose 10% Bolus 125 milliLiter(s) IV Bolus once  dextrose 10% Bolus 125 milliLiter(s) IV Bolus once  dextrose 5%. 1000 milliLiter(s) (50 mL/Hr) IV Continuous <Continuous>  dextrose 5%. 1000 milliLiter(s) (50 mL/Hr) IV Continuous <Continuous>  dextrose 5%. 1000 milliLiter(s) (100 mL/Hr) IV Continuous <Continuous>  dextrose 50% Injectable 25 Gram(s) IV Push once  dextrose 50% Injectable 25 Gram(s) IV Push once  dextrose 50% Injectable 12.5 Gram(s) IV Push once  glucagon  Injectable 1 milliGRAM(s) IntraMuscular once  insulin glargine Injectable (LANTUS) 18 Unit(s) SubCutaneous at bedtime  insulin lispro (ADMELOG) corrective regimen sliding scale   SubCutaneous three times a day before meals  insulin lispro Injectable (ADMELOG) 5 Unit(s) SubCutaneous three times a day before meals  lactated ringers. 1000 milliLiter(s) (75 mL/Hr) IV Continuous <Continuous>  lisinopril 5 milliGRAM(s) Oral daily  metroNIDAZOLE    Tablet 500 milliGRAM(s) Oral every 8 hours  vancomycin  IVPB 1750 milliGRAM(s) IV Intermittent every 12 hours    MEDICATIONS  (PRN):  acetaminophen     Tablet .. 650 milliGRAM(s) Oral every 6 hours PRN Temp greater or equal to 38C (100.4F), Mild Pain (1 - 3)  aluminum hydroxide/magnesium hydroxide/simethicone Suspension 30 milliLiter(s) Oral every 4 hours PRN Dyspepsia  dextrose Oral Gel 15 Gram(s) Oral once PRN Blood Glucose LESS THAN 70 milliGRAM(s)/deciliter  dextrose Oral Gel 15 Gram(s) Oral once PRN Blood Glucose LESS THAN 70 milliGRAM(s)/deciliter  melatonin 3 milliGRAM(s) Oral at bedtime PRN Insomnia  ondansetron Injectable 4 milliGRAM(s) IV Push every 8 hours PRN Nausea and/or Vomiting      HOME MEDICATIONS:  Home Medications:      VITALS:   T(F): 99.5 (06-07 @ 07:50), Max: 101.2 (06-05 @ 20:52)  HR: 77 (06-07 @ 07:50) (66 - 114)  BP: 157/87 (06-07 @ 07:50) (131/77 - 172/93)  BP(mean): --  RR: 18 (06-07 @ 07:50) (17 - 18)  SpO2: 98% (06-07 @ 07:50) (97% - 99%)    I&O's Summary    06 Jun 2024 07:01  -  07 Jun 2024 07:00  --------------------------------------------------------  IN: 1900 mL / OUT: 0 mL / NET: 1900 mL        REVIEW OF SYSTEMS:  CONSTITUTIONAL: No weakness, fevers or chills  EYES: No visual changes  ENT: No vertigo or throat pain   NECK: No pain or stiffness  RESPIRATORY: No cough, wheezing, hemoptysis; No shortness of breath  CARDIOVASCULAR: No chest pain or palpitations  GASTROINTESTINAL: No abdominal or epigastric pain. No nausea, vomiting, or hematemesis; No diarrhea or constipation. No melena or hematochezia.  GENITOURINARY: No Polyuria  NEUROLOGICAL:  No tremors, no Weakness or numbness  SKIN: No itching, no rashes  MSK: no joint pain    PHYSICAL EXAM:  GENERAL: Patient is awake , alert and oriented,  not in acute distress  EYES: No proptosis, no lid lag  NECK: No thyroid enlargement, no palpable nodules , no bruit  LUNGS: Clear to auscultation bilaterally   CARDIOVASCULAR: S1/S2 present, RRR , no murmurs or rubs  ABD: Soft, non-tender, non-distended, +BS  EXT: No CASEY  SKIN: No abdominal striae  NEURO: No tremors, DTR 2+    LABS:                        13.3   10.96 )-----------( 278      ( 06 Jun 2024 07:05 )             39.1     06-06    133<L>  |  96<L>  |  11  ----------------------------<  281<H>  4.1   |  26  |  1.0    Ca    9.0      06 Jun 2024 07:05    TPro  7.2  /  Alb  4.5  /  TBili  0.5  /  DBili  x   /  AST  13  /  ALT  15  /  AlkPhos  103  06-05    PT/INR - ( 05 Jun 2024 17:01 )   PT: 12.70 sec;   INR: 1.11 ratio         PTT - ( 05 Jun 2024 17:01 )  PTT:31.7 sec          POCT Blood Glucose.: 283 mg/dL (06-07-24 @ 07:56)  POCT Blood Glucose.: 259 mg/dL (06-06-24 @ 20:30)  POCT Blood Glucose.: 260 mg/dL (06-06-24 @ 16:10)  POCT Blood Glucose.: 264 mg/dL (06-06-24 @ 11:03)  POCT Blood Glucose.: 303 mg/dL (06-06-24 @ 08:03)  POCT Blood Glucose.: 314 mg/dL (06-05-24 @ 19:13)       HPI:  38y Male w/ PMHx of DM, herniated discs s/p L5-S1 fusion (9/2023), 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 Patient reports he began having pain and felt a bump on his buttock on Sunday. Gradually worsened and got larger. Denies any drainage. Denies fevers or chills at home. Has never had an abscess previously.     In ED vitals were  T(F): 98.7  HR: 114  BP: 134/66  RR: 18  SpO2: 98%    Labs were remarkable for leukocytosis WBC 12.74, Hgb 13.6, MCV 77.9, lactate 2.5 --> 2.3, AG 15,  HgbA1C 11.5 ( from 3/2024)     CTAP:   Abscess along the left medial gluteal fold measuring 6.1 x 3.9 cm    Pt was evaluated by Surgery in ED who performed bedside I&D with about 30 cc of puss removal. Pt was given Rocephin, Vanc, and flagyl and 2.8 L IVF in ED. He also received 8 units of IV regular insulin. He is being admitted to medicine for further workup.      (06 Jun 2024 00:48)    Endocrinology consulted for previous low TSH back in March 2024. Patient previously here in March 2024 for LE DVT and PE, found to have A1c 11.5% and TSH 0 at that time, discharged on Metformin 500mg BID. Pt states he has been taking only that since then. Denies any headache, dizziness, blurry vision, palpitations, chest pain, SOB, N/V/D/C, paresthesia, numbness/tingling, polyphagia, polydipsia, polyuria/dysuria.     PAST MEDICAL & SURGICAL HISTORY  Diabetes    History of back surgery    PE/DVT    FAMILY HISTORY:  FAMILY HISTORY:  Diabetes in mother and father    SOCIAL HISTORY:  []smoker non smoker  []Alcohol denies use  []Drug denies use    ALLERGIES:  No Known Allergies      MEDICATIONS:  MEDICATIONS  (STANDING):  apixaban 5 milliGRAM(s) Oral every 12 hours  atorvastatin 40 milliGRAM(s) Oral at bedtime  cefTRIAXone   IVPB 2000 milliGRAM(s) IV Intermittent every 24 hours  dextrose 10% Bolus 125 milliLiter(s) IV Bolus once  dextrose 10% Bolus 125 milliLiter(s) IV Bolus once  dextrose 5%. 1000 milliLiter(s) (50 mL/Hr) IV Continuous <Continuous>  dextrose 5%. 1000 milliLiter(s) (50 mL/Hr) IV Continuous <Continuous>  dextrose 5%. 1000 milliLiter(s) (100 mL/Hr) IV Continuous <Continuous>  dextrose 50% Injectable 25 Gram(s) IV Push once  dextrose 50% Injectable 25 Gram(s) IV Push once  dextrose 50% Injectable 12.5 Gram(s) IV Push once  glucagon  Injectable 1 milliGRAM(s) IntraMuscular once  insulin glargine Injectable (LANTUS) 18 Unit(s) SubCutaneous at bedtime  insulin lispro (ADMELOG) corrective regimen sliding scale   SubCutaneous three times a day before meals  insulin lispro Injectable (ADMELOG) 5 Unit(s) SubCutaneous three times a day before meals  lactated ringers. 1000 milliLiter(s) (75 mL/Hr) IV Continuous <Continuous>  lisinopril 5 milliGRAM(s) Oral daily  metroNIDAZOLE    Tablet 500 milliGRAM(s) Oral every 8 hours  vancomycin  IVPB 1750 milliGRAM(s) IV Intermittent every 12 hours    MEDICATIONS  (PRN):  acetaminophen     Tablet .. 650 milliGRAM(s) Oral every 6 hours PRN Temp greater or equal to 38C (100.4F), Mild Pain (1 - 3)  aluminum hydroxide/magnesium hydroxide/simethicone Suspension 30 milliLiter(s) Oral every 4 hours PRN Dyspepsia  dextrose Oral Gel 15 Gram(s) Oral once PRN Blood Glucose LESS THAN 70 milliGRAM(s)/deciliter  dextrose Oral Gel 15 Gram(s) Oral once PRN Blood Glucose LESS THAN 70 milliGRAM(s)/deciliter  melatonin 3 milliGRAM(s) Oral at bedtime PRN Insomnia  ondansetron Injectable 4 milliGRAM(s) IV Push every 8 hours PRN Nausea and/or Vomiting      HOME MEDICATIONS:  Home Medications:      VITALS:   T(F): 99.5 (06-07 @ 07:50), Max: 101.2 (06-05 @ 20:52)  HR: 77 (06-07 @ 07:50) (66 - 114)  BP: 157/87 (06-07 @ 07:50) (131/77 - 172/93)  BP(mean): --  RR: 18 (06-07 @ 07:50) (17 - 18)  SpO2: 98% (06-07 @ 07:50) (97% - 99%)    I&O's Summary    06 Jun 2024 07:01  -  07 Jun 2024 07:00  --------------------------------------------------------  IN: 1900 mL / OUT: 0 mL / NET: 1900 mL        REVIEW OF SYSTEMS:  CONSTITUTIONAL: No weakness, fevers or chills  EYES: No visual changes  ENT: No vertigo or throat pain   NECK: No pain or stiffness  RESPIRATORY: No cough, wheezing, hemoptysis; No shortness of breath  CARDIOVASCULAR: No chest pain or palpitations  GASTROINTESTINAL: No abdominal or epigastric pain. No nausea, vomiting, or hematemesis; No diarrhea or constipation. No melena or hematochezia.  GENITOURINARY: No Polyuria  NEUROLOGICAL:  No tremors, no Weakness or numbness  SKIN: No itching, no rashes  MSK: no joint pain    PHYSICAL EXAM:  GENERAL: Patient is awake , alert and oriented,  not in acute distress  EYES: No proptosis, no lid lag  NECK: No thyroid enlargement, no palpable nodules , no bruit  LUNGS: Clear to auscultation bilaterally   CARDIOVASCULAR: S1/S2 present, RRR , no murmurs or rubs  ABD: Soft, non-tender, non-distended, +BS  EXT: No CASEY  SKIN: No abdominal striae  NEURO: No tremors, DTR 2+    LABS:                        13.3   10.96 )-----------( 278      ( 06 Jun 2024 07:05 )             39.1     06-06    133<L>  |  96<L>  |  11  ----------------------------<  281<H>  4.1   |  26  |  1.0    Ca    9.0      06 Jun 2024 07:05    TPro  7.2  /  Alb  4.5  /  TBili  0.5  /  DBili  x   /  AST  13  /  ALT  15  /  AlkPhos  103  06-05    PT/INR - ( 05 Jun 2024 17:01 )   PT: 12.70 sec;   INR: 1.11 ratio         PTT - ( 05 Jun 2024 17:01 )  PTT:31.7 sec          POCT Blood Glucose.: 283 mg/dL (06-07-24 @ 07:56)  POCT Blood Glucose.: 259 mg/dL (06-06-24 @ 20:30)  POCT Blood Glucose.: 260 mg/dL (06-06-24 @ 16:10)  POCT Blood Glucose.: 264 mg/dL (06-06-24 @ 11:03)  POCT Blood Glucose.: 303 mg/dL (06-06-24 @ 08:03)  POCT Blood Glucose.: 314 mg/dL (06-05-24 @ 19:13)       HPI:  38y Male w/ PMHx of DM, herniated discs s/p L5-S1 fusion (9/2023), 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 Patient reports he began having pain and felt a bump on his buttock on Sunday. Gradually worsened and got larger. Denies any drainage. Denies fevers or chills at home. Has never had an abscess previously.     In ED vitals were  T(F): 98.7  HR: 114  BP: 134/66  RR: 18  SpO2: 98%    Labs were remarkable for leukocytosis WBC 12.74, Hgb 13.6, MCV 77.9, lactate 2.5 --> 2.3, AG 15,  HgbA1C 11.5 ( from 3/2024)     CTAP:   Abscess along the left medial gluteal fold measuring 6.1 x 3.9 cm    Pt was evaluated by Surgery in ED who performed bedside I&D with about 30 cc of puss removal. Pt was given Rocephin, Vanc, and flagyl and 2.8 L IVF in ED. He also received 8 units of IV regular insulin. He is being admitted to medicine for further workup.      (06 Jun 2024 00:48)    Endocrinology consulted for previous low TSH back in March 2024. Patient previously here in March 2024 for LE DVT and PE, found to have A1c 11.5% and TSH 0 at that time, discharged on Metformin 500mg BID. Pt states he has been taking only that since then. Denies any headache, dizziness, blurry vision, palpitations, chest pain, SOB, N/V/D/C, paresthesia, numbness/tingling, polyphagia, polydipsia, polyuria/dysuria.     PAST MEDICAL & SURGICAL HISTORY  Diabetes    History of back surgery    PE/DVT    FAMILY HISTORY:  FAMILY HISTORY:  Diabetes in mother and father    SOCIAL HISTORY:  []smoker non smoker  []Alcohol denies use  []Drug denies use    ALLERGIES:  No Known Allergies      MEDICATIONS:  MEDICATIONS  (STANDING):  apixaban 5 milliGRAM(s) Oral every 12 hours  atorvastatin 40 milliGRAM(s) Oral at bedtime  cefTRIAXone   IVPB 2000 milliGRAM(s) IV Intermittent every 24 hours  dextrose 10% Bolus 125 milliLiter(s) IV Bolus once  dextrose 10% Bolus 125 milliLiter(s) IV Bolus once  dextrose 5%. 1000 milliLiter(s) (50 mL/Hr) IV Continuous <Continuous>  dextrose 5%. 1000 milliLiter(s) (50 mL/Hr) IV Continuous <Continuous>  dextrose 5%. 1000 milliLiter(s) (100 mL/Hr) IV Continuous <Continuous>  dextrose 50% Injectable 25 Gram(s) IV Push once  dextrose 50% Injectable 25 Gram(s) IV Push once  dextrose 50% Injectable 12.5 Gram(s) IV Push once  glucagon  Injectable 1 milliGRAM(s) IntraMuscular once  insulin glargine Injectable (LANTUS) 18 Unit(s) SubCutaneous at bedtime  insulin lispro (ADMELOG) corrective regimen sliding scale   SubCutaneous three times a day before meals  insulin lispro Injectable (ADMELOG) 5 Unit(s) SubCutaneous three times a day before meals  lactated ringers. 1000 milliLiter(s) (75 mL/Hr) IV Continuous <Continuous>  lisinopril 5 milliGRAM(s) Oral daily  metroNIDAZOLE    Tablet 500 milliGRAM(s) Oral every 8 hours  vancomycin  IVPB 1750 milliGRAM(s) IV Intermittent every 12 hours    MEDICATIONS  (PRN):  acetaminophen     Tablet .. 650 milliGRAM(s) Oral every 6 hours PRN Temp greater or equal to 38C (100.4F), Mild Pain (1 - 3)  aluminum hydroxide/magnesium hydroxide/simethicone Suspension 30 milliLiter(s) Oral every 4 hours PRN Dyspepsia  dextrose Oral Gel 15 Gram(s) Oral once PRN Blood Glucose LESS THAN 70 milliGRAM(s)/deciliter  dextrose Oral Gel 15 Gram(s) Oral once PRN Blood Glucose LESS THAN 70 milliGRAM(s)/deciliter  melatonin 3 milliGRAM(s) Oral at bedtime PRN Insomnia  ondansetron Injectable 4 milliGRAM(s) IV Push every 8 hours PRN Nausea and/or Vomiting      HOME MEDICATIONS:  Home Medications:      VITALS:   T(F): 99.5 (06-07 @ 07:50), Max: 101.2 (06-05 @ 20:52)  HR: 77 (06-07 @ 07:50) (66 - 114)  BP: 157/87 (06-07 @ 07:50) (131/77 - 172/93)  BP(mean): --  RR: 18 (06-07 @ 07:50) (17 - 18)  SpO2: 98% (06-07 @ 07:50) (97% - 99%)    I&O's Summary    06 Jun 2024 07:01  -  07 Jun 2024 07:00  --------------------------------------------------------  IN: 1900 mL / OUT: 0 mL / NET: 1900 mL        REVIEW OF SYSTEMS:  CONSTITUTIONAL: No weakness, fevers or chills  EYES: No visual changes  ENT: No vertigo or throat pain   NECK: No pain or stiffness  RESPIRATORY: No cough, wheezing, hemoptysis; No shortness of breath  CARDIOVASCULAR: No chest pain or palpitations  GASTROINTESTINAL: No abdominal or epigastric pain. No nausea, vomiting, or hematemesis; No diarrhea or constipation  GENITOURINARY: No Polyuria  NEUROLOGICAL:  No tremors, no Weakness or numbness  SKIN: No itching, no rashes  MSK: no joint pain    PHYSICAL EXAM:  GENERAL: Patient is awake , alert and oriented,  not in acute distress, obese   EYES: No proptosis, no lid lag  NECK: No thyroid enlargement, possible right enlargement   LUNGS: Clear to auscultation bilaterally   CARDIOVASCULAR: S1/S2 present, RRR , no murmurs or rubs  ABD: Soft, non-tender, non-distended, +BS  EXT: No CASEY  SKIN: No abdominal striae, acanthosis   NEURO: No tremors, DTR 2+    LABS:                        13.3   10.96 )-----------( 278      ( 06 Jun 2024 07:05 )             39.1     06-06    133<L>  |  96<L>  |  11  ----------------------------<  281<H>  4.1   |  26  |  1.0    Ca    9.0      06 Jun 2024 07:05    TPro  7.2  /  Alb  4.5  /  TBili  0.5  /  DBili  x   /  AST  13  /  ALT  15  /  AlkPhos  103  06-05    PT/INR - ( 05 Jun 2024 17:01 )   PT: 12.70 sec;   INR: 1.11 ratio         PTT - ( 05 Jun 2024 17:01 )  PTT:31.7 sec          POCT Blood Glucose.: 283 mg/dL (06-07-24 @ 07:56)  POCT Blood Glucose.: 259 mg/dL (06-06-24 @ 20:30)  POCT Blood Glucose.: 260 mg/dL (06-06-24 @ 16:10)  POCT Blood Glucose.: 264 mg/dL (06-06-24 @ 11:03)  POCT Blood Glucose.: 303 mg/dL (06-06-24 @ 08:03)  POCT Blood Glucose.: 314 mg/dL (06-05-24 @ 19:13)    A1C with Estimated Average Glucose Result: 9.7: Method: Immunoassay

## 2024-06-07 NOTE — DISCHARGE NOTE PROVIDER - PROVIDER TOKENS
PROVIDER:[TOKEN:[[id number]],FOLLOWUP:[1 week]],PROVIDER:[TOKEN:[[id number]],FOLLOWUP:[1 week]] PROVIDER:[TOKEN:[[id number]],FOLLOWUP:[1 week]],PROVIDER:[TOKEN:[[id number]],FOLLOWUP:[1 week]],PROVIDER:[TOKEN:[[id number]],FOLLOWUP:[1 week]] PROVIDER:[TOKEN:[[id number]],FOLLOWUP:[1 week]],PROVIDER:[TOKEN:[[id number]],FOLLOWUP:[1 week]],PROVIDER:[TOKEN:[[id number]],FOLLOWUP:[1 week]],PROVIDER:[TOKEN:[[id number]],FOLLOWUP:[1 week]]

## 2024-06-07 NOTE — DISCHARGE NOTE PROVIDER - CARE PROVIDER_API CALL
Emilee Bruno  Geriatric Medicine  [address] [place] [address]  Phone: [phone]  Fax: [phone]  Follow Up Time: 1 week    Brayden Bansal  Infectious Disease  [address] [place] [address]  Phone: [phone]  Fax: [phone]  Follow Up Time: 1 week   Emilee Bruno  Geriatric Medicine  [address] [place] [address]  Phone: [phone]  Fax: [phone]  Follow Up Time: 1 week    Brayden Bansal  Infectious Disease  [address] [place] [address]  Phone: [phone]  Fax: [phone]  Follow Up Time: 1 week    Cherise Mackay  Endocrinology/Metab/Diabetes  [address] [place] [address]  Phone: [phone]  Fax: [phone]  Follow Up Time: 1 week   Emilee Bruno  Geriatric Medicine  [address] [place] [address]  Phone: [phone]  Fax: [phone]  Follow Up Time: 1 week    Brayden Bansal  Infectious Disease  [address] [place] [address]  Phone: [phone]  Fax: [phone]  Follow Up Time: 1 week    Cherise Mackay  Endocrinology/Metab/Diabetes  [address] [place] [address]  Phone: [phone]  Fax: [phone]  Follow Up Time: 1 week    Yesenia Beaulieu   Surgery  [address], [address] [address]  Phone: [phone]  Fax: [phone]  Follow Up Time: 1 week

## 2024-06-07 NOTE — DISCHARGE NOTE PROVIDER - NSDCMRMEDTOKEN_GEN_ALL_CORE_FT
apixaban 5 mg oral tablet: 2 tab(s) orally every 12 hours take 10mg twice a day for 6 more days, until 4/4, then on 4/5 start taking 5mg twice a day  metFORMIN 500 mg oral tablet: 1 tab(s) orally 2 times a day   amoxicillin-clavulanate 875 mg-125 mg oral tablet: 875 milligram(s) orally 2 times a day  apixaban 5 mg oral tablet: 2 tab(s) orally every 12 hours take 10mg twice a day for 6 more days, until 4/4, then on 4/5 start taking 5mg twice a day  glimepiride 2 mg oral tablet: 1 tab(s) orally 2 times a day  lisinopril 5 mg oral tablet: 1 tab(s) orally once a day  metFORMIN 1000 mg oral tablet: 1 tab(s) orally 2 times a day

## 2024-06-07 NOTE — DISCHARGE NOTE PROVIDER - NPI NUMBER (FOR S[employer]MIN USE ONLY) :
[[id number]],[[id number]] [[id number]],[[id number]],[[id number]] [[id number]],[[id number]],[[id number]],[[id number]]

## 2024-06-07 NOTE — DISCHARGE NOTE PROVIDER - HOSPITAL COURSE
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 Patient reports he began having pain and felt a bump on his buttock on Sunday. Gradually worsened and got larger. Denies any drainage. Denies fevers or chills at home. Has never had an abscess previously.     In ED vitals were  T(F): 98.7  HR: 114  BP: 134/66  RR: 18  SpO2: 98%    Labs were remarkable for leukocytosis WBC 12.74, Hgb 13.6, MCV 77.9, lactate 2.5 --> 2.3, AG 15,  HgbA1C 11.5 ( from 3/2024)     CTAP:   Abscess along the left medial gluteal fold measuring 6.1 x 3.9 cm    Pt was evaluated by Surgery in ED who performed bedside I&D with about 30 cc of puss removal. Pt was given Rocephin, Vanc, and flagyl and 2.8 L IVF in ED. He also received 8 units of IV regular insulin.     #Severe sepsis on admission likely 2/2 abscess (2/4 SIRS + Source + elevated lactate)   #L sided perianal abscess   - s/p I&D with surgery with 30 cc puss drained  - cultures sent - follow up for PO regimen outpatient  - S/p IV Vanc + Ceftriaxone + Flagyl   - Transitioned to PO Augmentin on discharge until 6/15    #DM with hyperglycemia   - A1C 11.1 in 3/2024   - Consistent Carbs diet   - Will send on insulin on discharge     #Low TSH  - TSH 0 in March  - Endocrinology:     #Herniated discs s/p L5-S1 fusion in 9/2023   - Outpatient PT/OT    #DVT/PE in 3/2024   - c/w Eliquis     #HTN  - Started lisinopril inpatient --> will continue on discharge      38y Male w/ PMHx of DM, herniated discs s/p L5-S1 fusion (9/2023), 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 Patient reports he began having pain and felt a bump on his buttock on Sunday. Gradually worsened and got larger. Denies any drainage. Denies fevers or chills at home. Has never had an abscess previously.     In ED vitals were  T(F): 98.7  HR: 114  BP: 134/66  RR: 18  SpO2: 98%    Labs were remarkable for leukocytosis WBC 12.74, Hgb 13.6, MCV 77.9, lactate 2.5 --> 2.3, AG 15,  HgbA1C 11.5 ( from 3/2024)     CTAP:   Abscess along the left medial gluteal fold measuring 6.1 x 3.9 cm    Pt was evaluated by Surgery in ED who performed bedside I&D with about 30 cc of puss removal. Pt was given Rocephin, Vanc, and flagyl and 2.8 L IVF in ED. He also received 8 units of IV regular insulin.     #Severe sepsis on admission likely 2/2 abscess (2/4 SIRS + Source + elevated lactate)   #L sided perianal abscess   - s/p I&D with surgery with 30 cc puss drained  - cultures sent - follow up for PO regimen outpatient  - S/p IV Vanc + Ceftriaxone + Flagyl   - Transitioned to PO Augmentin on discharge until 6/15    #DM with hyperglycemia   - A1C 11.1 in 3/2024   - Consistent Carbs diet   - Endo consult: send home on metformin 1000mg BID and glimiperide 2mg BID  - Endo follow up outpatient     #Low TSH  - TSH 0 in March  - Repeat TSH 4.7    #Herniated discs s/p L5-S1 fusion in 9/2023   - Outpatient PT/OT    #DVT/PE in 3/2024   - c/w Eliquis     #HTN  - Started lisinopril inpatient --> will continue on discharge

## 2024-06-07 NOTE — DISCHARGE NOTE PROVIDER - CARE PROVIDERS DIRECT ADDRESSES
,[email or web address] ,[email or web address] ,[email or web address]

## 2024-06-07 NOTE — DISCHARGE NOTE PROVIDER - NSDCFUADDAPPT_GEN_ALL_CORE_FT
Please call to make an appointment with Endocrinology, your PCP, and Infectious Disease.    It is important to follow up with Infectious Disease to follow up your wound cultures and see if your antibiotics need to be adjusted.  Please call to make an appointment with Endocrinology, surgery, your PCP, and Infectious Disease.    It is important to follow up with Infectious Disease to follow up your wound cultures and see if your antibiotics need to be adjusted.

## 2024-06-07 NOTE — PROGRESS NOTE ADULT - ASSESSMENT
# Perirectal abscess-- s/p I&D-- abscess cx shows gm positive cocci and gm negative rods-- ID agrees for augmentin.   full sen  is pending -- patient on cefepime vanco and flagyl here  # DM uncontrolled Hba1c is 11.2- now 9.7 - started on insulin-- diabetic teaching --seen by endocrine today  # Hyperthyroid with no symptoms-- TSH was 0 in march 2024--repeat TSH is pending  # hx of DVT/PE-- continue eliquis  #Spinal fusion L5 S1 after a fall-- was  a  now on disability due to fall.    DC plan today--spent more than 30mins. will inform him of sensitivity of final report.

## 2024-06-07 NOTE — CONSULT NOTE ADULT - ATTENDING COMMENTS
ACS Attending Note Attestation    Patient is examined and evaluated at the bedside with the residents/PAs. Treatment plan discussed with the team, nurses, and consulting physicians and consulting teams. Medications, radiological studies and all other relevant studies reviewed. I reviewed the resident/PA note and agreed with above assessment and plan with following additions and corrections.    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 Physical exam findings, imaging, and labs as documented above.     PLAN:  - S/P bedside incision and drainage - approx 30cc of pus removed and packed with 1/2 inch packing, covered with gauze and abd pad  - Patient to keep packing in place until tomorrow 6/6; can remove while in the shower.  - Recommend Augmentin x 7 days  - Pain control with tylenol and ibuprofen  - Patient on eliquis for hx of DVT and PE - it is expected that patient may bleed more than typical, but if he experiences an excessive amount or has any new symptoms including dizziness or feeling faint, he was advised to return to the ED for further eval  - Dispo per ED        Physical Exam:  Radiological studies reviewed by me with following noted:  Allergies    No Known Allergies    Intolerances      PAST MEDICAL & SURGICAL HISTORY:  Diabetes      History of back surgery        Vital Signs Last 24 Hrs  T(C): 36.5 (06 Jun 2024 01:16), Max: 38.4 (05 Jun 2024 20:52)  T(F): 97.7 (06 Jun 2024 01:16), Max: 101.2 (05 Jun 2024 20:52)  HR: 89 (06 Jun 2024 01:16) (79 - 114)  BP: 155/77 (06 Jun 2024 01:16) (134/66 - 166/91)  BP(mean): --  RR: 17 (06 Jun 2024 01:16) (17 - 18)  SpO2: 98% (06 Jun 2024 01:16) (98% - 99%)    Parameters below as of 06 Jun 2024 01:16  Patient On (Oxygen Delivery Method): room air                            13.6   12.74 )-----------( 266      ( 05 Jun 2024 17:01 )             40.1     06-05    131<L>  |  93<L>  |  17  ----------------------------<  376<H>  4.1   |  23  |  1.1    Ca    9.1      05 Jun 2024 17:01    TPro  7.2  /  Alb  4.5  /  TBili  0.5  /  DBili  x   /  AST  13  /  ALT  15  /  AlkPhos  103  06-05        Brittnee Patterson MD, FACS  Trauma/ACS/Surcical Critical care Attending
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 Endocrinology consulted for low TSH.    # poorly controlled type 2 DM / obesity s/p bariatric surgery   - A1c 9.7 this admission (6/6/24), previously 11.5% (3/27/24)   metformin 500mg BID  - -300s  - if remain inpatient :  - increase Lantus to 22 units daily   - Increase lispro to 8 units TIDAC with sliding scale 2: 50 > 150   - on discharge can increase metformin to 1000 mg BID and add glimepiride 2mg BID   - patient aware of diet changes he was able to get off DM meds in the past by weight loss post bariatric surgery   - discussed importance of DM control to heal and prevent infections       # low TSH ( 3/2024 )   - no hyperthyroid symptoms  - recheck TSH , FT4 and TT3     discussed with team

## 2024-06-08 ENCOUNTER — TRANSCRIPTION ENCOUNTER (OUTPATIENT)
Age: 39
End: 2024-06-08

## 2024-06-08 VITALS
RESPIRATION RATE: 20 BRPM | HEART RATE: 75 BPM | TEMPERATURE: 97 F | DIASTOLIC BLOOD PRESSURE: 80 MMHG | SYSTOLIC BLOOD PRESSURE: 150 MMHG | OXYGEN SATURATION: 98 %

## 2024-06-08 LAB
-  AMOXICILLIN/CLAVULANIC ACID: SIGNIFICANT CHANGE UP
-  AMPICILLIN/SULBACTAM: SIGNIFICANT CHANGE UP
-  AMPICILLIN: SIGNIFICANT CHANGE UP
-  AZTREONAM: SIGNIFICANT CHANGE UP
-  CEFAZOLIN: SIGNIFICANT CHANGE UP
-  CEFEPIME: SIGNIFICANT CHANGE UP
-  CEFOXITIN: SIGNIFICANT CHANGE UP
-  CEFTRIAXONE: SIGNIFICANT CHANGE UP
-  CIPROFLOXACIN: SIGNIFICANT CHANGE UP
-  ERTAPENEM: SIGNIFICANT CHANGE UP
-  GENTAMICIN: SIGNIFICANT CHANGE UP
-  IMIPENEM: SIGNIFICANT CHANGE UP
-  LEVOFLOXACIN: SIGNIFICANT CHANGE UP
-  MEROPENEM: SIGNIFICANT CHANGE UP
-  PIPERACILLIN/TAZOBACTAM: SIGNIFICANT CHANGE UP
-  TOBRAMYCIN: SIGNIFICANT CHANGE UP
-  TRIMETHOPRIM/SULFAMETHOXAZOLE: SIGNIFICANT CHANGE UP
ALBUMIN SERPL ELPH-MCNC: 4 G/DL — SIGNIFICANT CHANGE UP (ref 3.5–5.2)
ALP SERPL-CCNC: 79 U/L — SIGNIFICANT CHANGE UP (ref 30–115)
ALT FLD-CCNC: 17 U/L — SIGNIFICANT CHANGE UP (ref 0–41)
ANION GAP SERPL CALC-SCNC: 14 MMOL/L — SIGNIFICANT CHANGE UP (ref 7–14)
AST SERPL-CCNC: 17 U/L — SIGNIFICANT CHANGE UP (ref 0–41)
BASOPHILS # BLD AUTO: 0.07 K/UL — SIGNIFICANT CHANGE UP (ref 0–0.2)
BASOPHILS NFR BLD AUTO: 0.9 % — SIGNIFICANT CHANGE UP (ref 0–1)
BILIRUB SERPL-MCNC: 0.4 MG/DL — SIGNIFICANT CHANGE UP (ref 0.2–1.2)
BUN SERPL-MCNC: 12 MG/DL — SIGNIFICANT CHANGE UP (ref 10–20)
CALCIUM SERPL-MCNC: 9.2 MG/DL — SIGNIFICANT CHANGE UP (ref 8.4–10.5)
CHLORIDE SERPL-SCNC: 98 MMOL/L — SIGNIFICANT CHANGE UP (ref 98–110)
CHOLEST SERPL-MCNC: 153 MG/DL — SIGNIFICANT CHANGE UP
CO2 SERPL-SCNC: 26 MMOL/L — SIGNIFICANT CHANGE UP (ref 17–32)
CREAT SERPL-MCNC: 1 MG/DL — SIGNIFICANT CHANGE UP (ref 0.7–1.5)
EGFR: 98 ML/MIN/1.73M2 — SIGNIFICANT CHANGE UP
EOSINOPHIL # BLD AUTO: 0.16 K/UL — SIGNIFICANT CHANGE UP (ref 0–0.7)
EOSINOPHIL NFR BLD AUTO: 1.9 % — SIGNIFICANT CHANGE UP (ref 0–8)
GLUCOSE BLDC GLUCOMTR-MCNC: 262 MG/DL — HIGH (ref 70–99)
GLUCOSE SERPL-MCNC: 218 MG/DL — HIGH (ref 70–99)
HCT VFR BLD CALC: 39.8 % — LOW (ref 42–52)
HDLC SERPL-MCNC: 37 MG/DL — LOW
HGB BLD-MCNC: 13.5 G/DL — LOW (ref 14–18)
IMM GRANULOCYTES NFR BLD AUTO: 0.9 % — HIGH (ref 0.1–0.3)
LIPID PNL WITH DIRECT LDL SERPL: 102 MG/DL — HIGH
LYMPHOCYTES # BLD AUTO: 2.14 K/UL — SIGNIFICANT CHANGE UP (ref 1.2–3.4)
LYMPHOCYTES # BLD AUTO: 26 % — SIGNIFICANT CHANGE UP (ref 20.5–51.1)
MAGNESIUM SERPL-MCNC: 2.2 MG/DL — SIGNIFICANT CHANGE UP (ref 1.8–2.4)
MCHC RBC-ENTMCNC: 26.6 PG — LOW (ref 27–31)
MCHC RBC-ENTMCNC: 33.9 G/DL — SIGNIFICANT CHANGE UP (ref 32–37)
MCV RBC AUTO: 78.5 FL — LOW (ref 80–94)
METHOD TYPE: SIGNIFICANT CHANGE UP
MONOCYTES # BLD AUTO: 0.69 K/UL — HIGH (ref 0.1–0.6)
MONOCYTES NFR BLD AUTO: 8.4 % — SIGNIFICANT CHANGE UP (ref 1.7–9.3)
NEUTROPHILS # BLD AUTO: 5.1 K/UL — SIGNIFICANT CHANGE UP (ref 1.4–6.5)
NEUTROPHILS NFR BLD AUTO: 61.9 % — SIGNIFICANT CHANGE UP (ref 42.2–75.2)
NON HDL CHOLESTEROL: 116 MG/DL — SIGNIFICANT CHANGE UP
NRBC # BLD: 0 /100 WBCS — SIGNIFICANT CHANGE UP (ref 0–0)
PLATELET # BLD AUTO: 292 K/UL — SIGNIFICANT CHANGE UP (ref 130–400)
PMV BLD: 8.9 FL — SIGNIFICANT CHANGE UP (ref 7.4–10.4)
POTASSIUM SERPL-MCNC: 4.2 MMOL/L — SIGNIFICANT CHANGE UP (ref 3.5–5)
POTASSIUM SERPL-SCNC: 4.2 MMOL/L — SIGNIFICANT CHANGE UP (ref 3.5–5)
PROT SERPL-MCNC: 6.3 G/DL — SIGNIFICANT CHANGE UP (ref 6–8)
RBC # BLD: 5.07 M/UL — SIGNIFICANT CHANGE UP (ref 4.7–6.1)
RBC # FLD: 15 % — HIGH (ref 11.5–14.5)
SODIUM SERPL-SCNC: 138 MMOL/L — SIGNIFICANT CHANGE UP (ref 135–146)
T3 SERPL-MCNC: 80 NG/DL — SIGNIFICANT CHANGE UP (ref 80–200)
T4 FREE SERPL-MCNC: 1.3 NG/DL — SIGNIFICANT CHANGE UP (ref 0.9–1.8)
TRIGL SERPL-MCNC: 75 MG/DL — SIGNIFICANT CHANGE UP
WBC # BLD: 8.23 K/UL — SIGNIFICANT CHANGE UP (ref 4.8–10.8)
WBC # FLD AUTO: 8.23 K/UL — SIGNIFICANT CHANGE UP (ref 4.8–10.8)

## 2024-06-08 PROCEDURE — 99239 HOSP IP/OBS DSCHRG MGMT >30: CPT

## 2024-06-08 RX ORDER — GLIMEPIRIDE 1 MG
1 TABLET ORAL
Qty: 60 | Refills: 0
Start: 2024-06-08 | End: 2024-07-07

## 2024-06-08 RX ORDER — LISINOPRIL 2.5 MG/1
1 TABLET ORAL
Qty: 30 | Refills: 0
Start: 2024-06-08 | End: 2024-07-07

## 2024-06-08 RX ORDER — METFORMIN HYDROCHLORIDE 850 MG/1
1 TABLET ORAL
Qty: 60 | Refills: 0
Start: 2024-06-08 | End: 2024-07-07

## 2024-06-08 RX ADMIN — Medication 500 MILLIGRAM(S): at 05:20

## 2024-06-08 RX ADMIN — LISINOPRIL 5 MILLIGRAM(S): 2.5 TABLET ORAL at 05:19

## 2024-06-08 RX ADMIN — Medication 6: at 07:57

## 2024-06-08 RX ADMIN — Medication 250 MILLIGRAM(S): at 06:28

## 2024-06-08 RX ADMIN — Medication 8 UNIT(S): at 07:57

## 2024-06-08 RX ADMIN — CEFTRIAXONE 100 MILLIGRAM(S): 500 INJECTION, POWDER, FOR SOLUTION INTRAMUSCULAR; INTRAVENOUS at 00:45

## 2024-06-08 RX ADMIN — APIXABAN 5 MILLIGRAM(S): 2.5 TABLET, FILM COATED ORAL at 05:20

## 2024-06-08 NOTE — PROGRESS NOTE ADULT - ASSESSMENT
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 Patient reports he began having pain and felt a bump on his buttock on Sunday. Gradually worsened and got larger. Denies any drainage. Denies fevers or chills at home. Has never had an abscess previously.     #Severe sepsis on admission likely 2/2 abscess (2/4 SIRS + Source + elevated lactate)   #L sided perianal abscess   - s/p I&D with surgery with 30 cc puss drained  - cultures sent - follow up for PO regimen   - c/w Vanc + Ceftriaxone + Flagyl for now     #DM with hyperglycemia   - A1C 11.1 in 3/2024   - Consistent Carbs diet   - Adjust lantus and lispro as needed   - Follow up Endocrinology consult     #Low TSH  - TSH 0 in March  - Follow up Endocrinology consult     #Herniated discs s/p L5-S1 fusion in 9/2023   - Outpatient PT/OT    #DVT/PE in 3/2024   - c/w Eliquis     #HTN  - Started lisinopril inpatient     #DVT PPx: c/w Eliquis  #Diet: Consistent Carbs  #Activity: IAT  #GI PPx: PPI    38y Male w/ PMHx of DM, herniated discs s/p L5-S1 fusion (9/2023), 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 Patient reports he began having pain and felt a bump on his buttock on Sunday. Gradually worsened and got larger. Denies any drainage. Denies fevers or chills at home. Has never had an abscess previously.     #Severe sepsis on admission likely 2/2 abscess (2/4 SIRS + Source + elevated lactate)   #L sided perianal abscess   - s/p I&D with surgery with 30 cc puss drained  - cultures sent - follow up for PO regimen outpatient  - S/p IV Vanc + Ceftriaxone + Flagyl   - Transitioned to PO Augmentin on discharge until 6/15    #DM with hyperglycemia   - A1C 11.1 in 3/2024   - Consistent Carbs diet   - Endo consult: send home on metformin 1000mg BID and glimiperide 2mg BID  - Endo follow up outpatient     #Low TSH  - TSH 0 in March  - Repeat TSH 4.7    #Herniated discs s/p L5-S1 fusion in 9/2023   - Outpatient PT/OT    #DVT/PE in 3/2024   - c/w Eliquis     #HTN  - Started lisinopril inpatient --> will continue on discharge     #DVT PPx: c/w Eliquis  #Diet: Consistent Carbs  #Activity: IAT  #GI PPx: PPI

## 2024-06-08 NOTE — DISCHARGE NOTE NURSING/CASE MANAGEMENT/SOCIAL WORK - PATIENT PORTAL LINK FT
You can access the FollowMyHealth Patient Portal offered by [facility] by registering at the following website: [email or web address]. By joining [employer]’s FollowMyHealth portal, you will also be able to view your health information using other applications (apps) compatible with our system.

## 2024-06-08 NOTE — DISCHARGE NOTE NURSING/CASE MANAGEMENT/SOCIAL WORK - NSDCPEFALRISK_GEN_ALL_CORE
For information on Fall & Injury Prevention, visit: [email or web address] OR  [email or web address] OR  https://www.cdc.gov/steadi/patient.html

## 2024-06-08 NOTE — PROGRESS NOTE ADULT - SUBJECTIVE AND OBJECTIVE BOX
SUBJECTIVE:    Patient is a 38y old Male who presents with a chief complaint of abscess (08 Jun 2024 07:26)    Currently admitted to medicine with the primary diagnosis of Abscess of buttock       Today is hospital day 3d.     PAST MEDICAL & SURGICAL HISTORY  Diabetes    History of back surgery      ALLERGIES:  No Known Allergies    MEDICATIONS:  STANDING MEDICATIONS  apixaban 5 milliGRAM(s) Oral every 12 hours  cefTRIAXone   IVPB 2000 milliGRAM(s) IV Intermittent every 24 hours  dextrose 10% Bolus 125 milliLiter(s) IV Bolus once  dextrose 10% Bolus 125 milliLiter(s) IV Bolus once  dextrose 5%. 1000 milliLiter(s) IV Continuous <Continuous>  dextrose 5%. 1000 milliLiter(s) IV Continuous <Continuous>  dextrose 5%. 1000 milliLiter(s) IV Continuous <Continuous>  dextrose 50% Injectable 25 Gram(s) IV Push once  dextrose 50% Injectable 25 Gram(s) IV Push once  dextrose 50% Injectable 12.5 Gram(s) IV Push once  glucagon  Injectable 1 milliGRAM(s) IntraMuscular once  insulin glargine Injectable (LANTUS) 22 Unit(s) SubCutaneous at bedtime  insulin lispro (ADMELOG) corrective regimen sliding scale   SubCutaneous three times a day before meals  insulin lispro Injectable (ADMELOG) 8 Unit(s) SubCutaneous three times a day before meals  lactated ringers. 1000 milliLiter(s) IV Continuous <Continuous>  lisinopril 5 milliGRAM(s) Oral daily  metroNIDAZOLE    Tablet 500 milliGRAM(s) Oral every 8 hours  vancomycin  IVPB 1750 milliGRAM(s) IV Intermittent every 12 hours    PRN MEDICATIONS  acetaminophen     Tablet .. 650 milliGRAM(s) Oral every 6 hours PRN  aluminum hydroxide/magnesium hydroxide/simethicone Suspension 30 milliLiter(s) Oral every 4 hours PRN  dextrose Oral Gel 15 Gram(s) Oral once PRN  dextrose Oral Gel 15 Gram(s) Oral once PRN  melatonin 3 milliGRAM(s) Oral at bedtime PRN  ondansetron Injectable 4 milliGRAM(s) IV Push every 8 hours PRN    VITALS:   T(F): 97.2  HR: 75  BP: 150/80  RR: 20  SpO2: 98%    LABS:                        13.5   8.23  )-----------( 292      ( 08 Jun 2024 06:44 )             39.8     06-08    138  |  98  |  12  ----------------------------<  218<H>  4.2   |  26  |  1.0    Ca    9.2      08 Jun 2024 06:44  Mg     2.2     06-08    TPro  6.3  /  Alb  4.0  /  TBili  0.4  /  DBili  x   /  AST  17  /  ALT  17  /  AlkPhos  79  06-08      Urinalysis Basic - ( 08 Jun 2024 06:44 )    Color: x / Appearance: x / SG: x / pH: x  Gluc: 218 mg/dL / Ketone: x  / Bili: x / Urobili: x   Blood: x / Protein: x / Nitrite: x   Leuk Esterase: x / RBC: x / WBC x   Sq Epi: x / Non Sq Epi: x / Bacteria: x            Culture - Abscess with Gram Stain (collected 05 Jun 2024 22:24)  Source: .Abscess perianal  Gram Stain (06 Jun 2024 11:28):    Numerous polymorphonuclear leukocytes seen per low power field    Moderate Gram positive cocci in pairs seen per oil power field    Moderate Gram Negative Rods seen per oil power field  Preliminary Report (08 Jun 2024 10:44):    Moderate Escherichia coli    Numerous Prevotella bivia "Susceptibilities not performed"  Organism: Escherichia coli (08 Jun 2024 08:32)  Organism: Escherichia coli (08 Jun 2024 08:32)    Culture - Blood (collected 05 Jun 2024 17:01)  Source: .Blood Blood-Peripheral  Preliminary Report (07 Jun 2024 23:02):    No growth at 48 Hours    Culture - Blood (collected 05 Jun 2024 17:01)  Source: .Blood Blood-Peripheral  Preliminary Report (07 Jun 2024 23:02):    No growth at 48 Hours          RADIOLOGY:    PHYSICAL EXAM:  GEN: No acute distress  LUNGS: Clear to auscultation bilaterally   HEART: S1/S2 present. RRR.   ABD/ GI: Soft, non-tender, non-distended. Bowel sounds present  EXT: NC/NC/NE/2+PP/NOVOA  NEURO: AAOX3    
24H events:    Patient is a 38y old Male who presents with a chief complaint of abscess (07 Jun 2024 09:10)    Primary diagnosis of Abscess of buttock    Today is hospital day 2d. This morning patient was seen and examined at bedside, resting comfortably in bed.    No acute or major events overnight.    Family communication:  Contact date:  Name of person contacted:  Relationship to patient:  Communication details:  What matters most:    PAST MEDICAL & SURGICAL HISTORY  Diabetes    History of back surgery      SOCIAL HISTORY:  Social History:      ALLERGIES:  No Known Allergies    MEDICATIONS:  STANDING MEDICATIONS  apixaban 5 milliGRAM(s) Oral every 12 hours  atorvastatin 40 milliGRAM(s) Oral at bedtime  cefTRIAXone   IVPB 2000 milliGRAM(s) IV Intermittent every 24 hours  dextrose 10% Bolus 125 milliLiter(s) IV Bolus once  dextrose 10% Bolus 125 milliLiter(s) IV Bolus once  dextrose 5%. 1000 milliLiter(s) IV Continuous <Continuous>  dextrose 5%. 1000 milliLiter(s) IV Continuous <Continuous>  dextrose 5%. 1000 milliLiter(s) IV Continuous <Continuous>  dextrose 50% Injectable 25 Gram(s) IV Push once  dextrose 50% Injectable 25 Gram(s) IV Push once  dextrose 50% Injectable 12.5 Gram(s) IV Push once  glucagon  Injectable 1 milliGRAM(s) IntraMuscular once  insulin glargine Injectable (LANTUS) 18 Unit(s) SubCutaneous at bedtime  insulin lispro (ADMELOG) corrective regimen sliding scale   SubCutaneous three times a day before meals  insulin lispro Injectable (ADMELOG) 5 Unit(s) SubCutaneous three times a day before meals  lactated ringers. 1000 milliLiter(s) IV Continuous <Continuous>  lisinopril 5 milliGRAM(s) Oral daily  metroNIDAZOLE    Tablet 500 milliGRAM(s) Oral every 8 hours  vancomycin  IVPB 1750 milliGRAM(s) IV Intermittent every 12 hours    PRN MEDICATIONS  acetaminophen     Tablet .. 650 milliGRAM(s) Oral every 6 hours PRN  aluminum hydroxide/magnesium hydroxide/simethicone Suspension 30 milliLiter(s) Oral every 4 hours PRN  dextrose Oral Gel 15 Gram(s) Oral once PRN  dextrose Oral Gel 15 Gram(s) Oral once PRN  melatonin 3 milliGRAM(s) Oral at bedtime PRN  ondansetron Injectable 4 milliGRAM(s) IV Push every 8 hours PRN    VITALS:   T(F): 99.5  HR: 77  BP: 157/87  RR: 18  SpO2: 98%    PHYSICAL EXAM:  CONSTITUTIONAL: Well groomed, no apparent distress  EYES: PERRLA and symmetric, EOMI, No conjunctival or scleral injection, non-icteric  ENMT: Oral mucosa with moist membranes. No external nasal lesions; nasal mucosa not inflamed; normal dentition; no pharyngeal injection or exudates	  NECK: Supple, symmetric and without tracheal deviation  RESPIRATORY: No respiratory distress, no use of accessory muscles; CTA b/l, no wheezes  CARDIOVASCULAR: RRRR, +S1S2, no murmurs, no rubs, no gallops  GASTROINTESTINAL: Soft, non tender, non distended, no rebound, no guarding  MUSCULOSKELETAL:  no digital clubbing or cyanosis; examination of the (head/neck, spine/ribs/pelvis, RUE, LUE, RLE, LLE) without misalignment, normal range of motion without pain  SKIN: erythematous, fluctuant, indurated left perianal abscess s/p I&D with packing material inside   NEUROLOGIC: no focal deficit noted   PSYCHIATRIC: Appropriate insight/judgment; A+O x 3, mood and affect appropriate, recent/remote memory intact    LABS:                        13.3   10.96 )-----------( 278      ( 06 Jun 2024 07:05 )             39.1     06-06    133<L>  |  96<L>  |  11  ----------------------------<  281<H>  4.1   |  26  |  1.0    Ca    9.0      06 Jun 2024 07:05    TPro  7.2  /  Alb  4.5  /  TBili  0.5  /  DBili  x   /  AST  13  /  ALT  15  /  AlkPhos  103  06-05    PT/INR - ( 05 Jun 2024 17:01 )   PT: 12.70 sec;   INR: 1.11 ratio         PTT - ( 05 Jun 2024 17:01 )  PTT:31.7 sec  Urinalysis Basic - ( 06 Jun 2024 07:05 )    Color: x / Appearance: x / SG: x / pH: x  Gluc: 281 mg/dL / Ketone: x  / Bili: x / Urobili: x   Blood: x / Protein: x / Nitrite: x   Leuk Esterase: x / RBC: x / WBC x   Sq Epi: x / Non Sq Epi: x / Bacteria: x    Culture - Abscess with Gram Stain (collected 05 Jun 2024 22:24)  Source: .Abscess perianal  Gram Stain (06 Jun 2024 11:28):    Numerous polymorphonuclear leukocytes seen per low power field    Moderate Gram positive cocci in pairs seen per oil power field    Moderate Gram Negative Rods seen per oil power field    Culture - Blood (collected 05 Jun 2024 17:01)  Source: .Blood Blood-Peripheral  Preliminary Report (06 Jun 2024 23:02):    No growth at 24 hours    Culture - Blood (collected 05 Jun 2024 17:01)  Source: .Blood Blood-Peripheral  Preliminary Report (06 Jun 2024 23:02):    No growth at 24 hours  
SUBJECTIVE:    Patient is a 38y old Male who presents with a chief complaint of abscess (07 Jun 2024 13:18)    Currently admitted to medicine with the primary diagnosis of Abscess of buttock       Today is hospital day 2d.     PAST MEDICAL & SURGICAL HISTORY  Diabetes    History of back surgery      ALLERGIES:  No Known Allergies    MEDICATIONS:  STANDING MEDICATIONS  apixaban 5 milliGRAM(s) Oral every 12 hours  atorvastatin 40 milliGRAM(s) Oral at bedtime  cefTRIAXone   IVPB 2000 milliGRAM(s) IV Intermittent every 24 hours  dextrose 10% Bolus 125 milliLiter(s) IV Bolus once  dextrose 10% Bolus 125 milliLiter(s) IV Bolus once  dextrose 5%. 1000 milliLiter(s) IV Continuous <Continuous>  dextrose 5%. 1000 milliLiter(s) IV Continuous <Continuous>  dextrose 5%. 1000 milliLiter(s) IV Continuous <Continuous>  dextrose 50% Injectable 25 Gram(s) IV Push once  dextrose 50% Injectable 25 Gram(s) IV Push once  dextrose 50% Injectable 12.5 Gram(s) IV Push once  glucagon  Injectable 1 milliGRAM(s) IntraMuscular once  insulin glargine Injectable (LANTUS) 18 Unit(s) SubCutaneous at bedtime  insulin lispro (ADMELOG) corrective regimen sliding scale   SubCutaneous three times a day before meals  insulin lispro Injectable (ADMELOG) 5 Unit(s) SubCutaneous three times a day before meals  lactated ringers. 1000 milliLiter(s) IV Continuous <Continuous>  lisinopril 5 milliGRAM(s) Oral daily  metroNIDAZOLE    Tablet 500 milliGRAM(s) Oral every 8 hours  vancomycin  IVPB 1750 milliGRAM(s) IV Intermittent every 12 hours    PRN MEDICATIONS  acetaminophen     Tablet .. 650 milliGRAM(s) Oral every 6 hours PRN  aluminum hydroxide/magnesium hydroxide/simethicone Suspension 30 milliLiter(s) Oral every 4 hours PRN  dextrose Oral Gel 15 Gram(s) Oral once PRN  dextrose Oral Gel 15 Gram(s) Oral once PRN  melatonin 3 milliGRAM(s) Oral at bedtime PRN  ondansetron Injectable 4 milliGRAM(s) IV Push every 8 hours PRN    VITALS:   T(F): 99.5  HR: 77  BP: 157/87  RR: 18  SpO2: 98%    LABS:                        13.1   9.76  )-----------( 307      ( 07 Jun 2024 11:47 )             38.0     06-07    137  |  97<L>  |  9<L>  ----------------------------<  264<H>  4.1   |  27  |  1.1    Ca    9.2      07 Jun 2024 11:47  Mg     2.2     06-07    TPro  7.2  /  Alb  4.5  /  TBili  0.5  /  DBili  x   /  AST  13  /  ALT  15  /  AlkPhos  103  06-05    PT/INR - ( 05 Jun 2024 17:01 )   PT: 12.70 sec;   INR: 1.11 ratio         PTT - ( 05 Jun 2024 17:01 )  PTT:31.7 sec  Urinalysis Basic - ( 07 Jun 2024 11:47 )    Color: x / Appearance: x / SG: x / pH: x  Gluc: 264 mg/dL / Ketone: x  / Bili: x / Urobili: x   Blood: x / Protein: x / Nitrite: x   Leuk Esterase: x / RBC: x / WBC x   Sq Epi: x / Non Sq Epi: x / Bacteria: x            Culture - Abscess with Gram Stain (collected 05 Jun 2024 22:24)  Source: .Abscess perianal  Gram Stain (06 Jun 2024 11:28):    Numerous polymorphonuclear leukocytes seen per low power field    Moderate Gram positive cocci in pairs seen per oil power field    Moderate Gram Negative Rods seen per oil power field    Culture - Blood (collected 05 Jun 2024 17:01)  Source: .Blood Blood-Peripheral  Preliminary Report (06 Jun 2024 23:02):    No growth at 24 hours    Culture - Blood (collected 05 Jun 2024 17:01)  Source: .Blood Blood-Peripheral  Preliminary Report (06 Jun 2024 23:02):    No growth at 24 hours          RADIOLOGY:    PHYSICAL EXAM:  GEN: No acute distress  LUNGS: Clear to auscultation bilaterally   HEART: S1/S2 present. RRR.   ABD/ GI: Soft, non-tender, non-distended. Bowel sounds present  EXT: NC/NC/NE/2+PP/NOVOA  NEURO: AAOX3    
24H events:    Patient is a 38y old Male who presents with a chief complaint of abscess (07 Jun 2024 14:27)    Primary diagnosis of Abscess of buttock    Today is hospital day 3d. This morning patient was seen and examined at bedside, resting comfortably in bed.    No acute or major events overnight.    Family communication:  Contact date:  Name of person contacted:  Relationship to patient:  Communication details:  What matters most:    PAST MEDICAL & SURGICAL HISTORY  Diabetes    History of back surgery      SOCIAL HISTORY:  Social History:      ALLERGIES:  No Known Allergies    MEDICATIONS:  STANDING MEDICATIONS  apixaban 5 milliGRAM(s) Oral every 12 hours  atorvastatin 40 milliGRAM(s) Oral at bedtime  cefTRIAXone   IVPB 2000 milliGRAM(s) IV Intermittent every 24 hours  dextrose 10% Bolus 125 milliLiter(s) IV Bolus once  dextrose 10% Bolus 125 milliLiter(s) IV Bolus once  dextrose 5%. 1000 milliLiter(s) IV Continuous <Continuous>  dextrose 5%. 1000 milliLiter(s) IV Continuous <Continuous>  dextrose 5%. 1000 milliLiter(s) IV Continuous <Continuous>  dextrose 50% Injectable 25 Gram(s) IV Push once  dextrose 50% Injectable 12.5 Gram(s) IV Push once  dextrose 50% Injectable 25 Gram(s) IV Push once  glucagon  Injectable 1 milliGRAM(s) IntraMuscular once  insulin glargine Injectable (LANTUS) 22 Unit(s) SubCutaneous at bedtime  insulin lispro (ADMELOG) corrective regimen sliding scale   SubCutaneous three times a day before meals  insulin lispro Injectable (ADMELOG) 8 Unit(s) SubCutaneous three times a day before meals  lactated ringers. 1000 milliLiter(s) IV Continuous <Continuous>  lisinopril 5 milliGRAM(s) Oral daily  metroNIDAZOLE    Tablet 500 milliGRAM(s) Oral every 8 hours  vancomycin  IVPB 1750 milliGRAM(s) IV Intermittent every 12 hours    PRN MEDICATIONS  acetaminophen     Tablet .. 650 milliGRAM(s) Oral every 6 hours PRN  aluminum hydroxide/magnesium hydroxide/simethicone Suspension 30 milliLiter(s) Oral every 4 hours PRN  dextrose Oral Gel 15 Gram(s) Oral once PRN  dextrose Oral Gel 15 Gram(s) Oral once PRN  melatonin 3 milliGRAM(s) Oral at bedtime PRN  ondansetron Injectable 4 milliGRAM(s) IV Push every 8 hours PRN    VITALS:   T(F): 98.5  HR: 64  BP: 150/93  RR: 18  SpO2: 97%    PHYSICAL EXAM:  CONSTITUTIONAL: Well groomed, no apparent distress  EYES: PERRLA and symmetric, EOMI, No conjunctival or scleral injection, non-icteric  ENMT: Oral mucosa with moist membranes. No external nasal lesions; nasal mucosa not inflamed; normal dentition; no pharyngeal injection or exudates	  NECK: Supple, symmetric and without tracheal deviation  RESPIRATORY: No respiratory distress, no use of accessory muscles; CTA b/l, no wheezes  CARDIOVASCULAR: RRRR, +S1S2, no murmurs, no rubs, no gallops  GASTROINTESTINAL: Soft, non tender, non distended, no rebound, no guarding  MUSCULOSKELETAL:  no digital clubbing or cyanosis; examination of the (head/neck, spine/ribs/pelvis, RUE, LUE, RLE, LLE) without misalignment, normal range of motion without pain  SKIN: erythematous, fluctuant, indurated left perianal abscess s/p I&D with packing material inside   NEUROLOGIC: no focal deficit noted   PSYCHIATRIC: Appropriate insight/judgment; A+O x 3, mood and affect appropriate, recent/remote memory intact    LABS:                        13.5   8.23  )-----------( 292      ( 08 Jun 2024 06:44 )             39.8     06-07    137  |  97<L>  |  9<L>  ----------------------------<  264<H>  4.1   |  27  |  1.1    Ca    9.2      07 Jun 2024 11:47  Mg     2.2     06-07        Urinalysis Basic - ( 07 Jun 2024 11:47 )    Color: x / Appearance: x / SG: x / pH: x  Gluc: 264 mg/dL / Ketone: x  / Bili: x / Urobili: x   Blood: x / Protein: x / Nitrite: x   Leuk Esterase: x / RBC: x / WBC x   Sq Epi: x / Non Sq Epi: x / Bacteria: x      Culture - Abscess with Gram Stain (collected 05 Jun 2024 22:24)  Source: .Abscess perianal  Gram Stain (06 Jun 2024 11:28):    Numerous polymorphonuclear leukocytes seen per low power field    Moderate Gram positive cocci in pairs seen per oil power field    Moderate Gram Negative Rods seen per oil power field  Preliminary Report (07 Jun 2024 16:21):    Moderate Escherichia coli    Culture - Blood (collected 05 Jun 2024 17:01)  Source: .Blood Blood-Peripheral  Preliminary Report (07 Jun 2024 23:02):    No growth at 48 Hours    Culture - Blood (collected 05 Jun 2024 17:01)  Source: .Blood Blood-Peripheral  Preliminary Report (07 Jun 2024 23:02):    No growth at 48 Hours          
RASHAAD CORDERO  38y, Male  Allergy: No Known Allergies      LOS  2d    CHIEF COMPLAINT: abscess (07 Jun 2024 10:42)      INTERVAL EVENTS/HPI  - No acute events overnight  - T(F): , Max: 99.5 (06-07-24 @ 07:50)  - reports pain improving   - WBC Count: 10.96 (06-06-24 @ 07:05)  WBC Count: 12.74 (06-05-24 @ 17:01)     - Creatinine: 1.0 (06-06-24 @ 07:05)  Creatinine: 1.1 (06-05-24 @ 17:01)       ROS  General: Denies rigors, nightsweats  HEENT: Denies headache, rhinorrhea, sore throat, eye pain  CV: Denies CP, palpitations  PULM: Denies wheezing, hemoptysis  GI: Denies hematemesis, hematochezia, melena  : Denies discharge, hematuria  MSK: Denies arthralgias, myalgias  SKIN: Denies rash, lesions  NEURO: Denies paresthesias, weakness  PSYCH: Denies depression, anxiety    VITALS:  T(F): 99.5, Max: 99.5 (06-07-24 @ 07:50)  HR: 77  BP: 157/87  RR: 18Vital Signs Last 24 Hrs  T(C): 37.5 (07 Jun 2024 07:50), Max: 37.5 (07 Jun 2024 07:50)  T(F): 99.5 (07 Jun 2024 07:50), Max: 99.5 (07 Jun 2024 07:50)  HR: 77 (07 Jun 2024 07:50) (66 - 79)  BP: 157/87 (07 Jun 2024 07:50) (131/77 - 172/93)  BP(mean): --  RR: 18 (07 Jun 2024 07:50) (18 - 18)  SpO2: 98% (07 Jun 2024 07:50) (97% - 98%)    Parameters below as of 07 Jun 2024 07:50  Patient On (Oxygen Delivery Method): room air        PHYSICAL EXAM:  Gen: NAD, resting in bed  HEENT: Normocephalic, atraumatic  Neck: supple, no lymphadenopathy  CV: Regular rate & regular rhythm  Lungs: decreased BS at bases, no fremitus  Abdomen: Soft, BS present  Ext: Warm, well perfused  Neuro: non focal, awake  Skin: perirectal wound packed - mild induration   Lines: no phlebitis    FH: Non-contributory  Social Hx: Non-contributory    TESTS & MEASUREMENTS:                        13.3   10.96 )-----------( 278      ( 06 Jun 2024 07:05 )             39.1     06-06    133<L>  |  96<L>  |  11  ----------------------------<  281<H>  4.1   |  26  |  1.0    Ca    9.0      06 Jun 2024 07:05    TPro  7.2  /  Alb  4.5  /  TBili  0.5  /  DBili  x   /  AST  13  /  ALT  15  /  AlkPhos  103  06-05      LIVER FUNCTIONS - ( 05 Jun 2024 17:01 )  Alb: 4.5 g/dL / Pro: 7.2 g/dL / ALK PHOS: 103 U/L / ALT: 15 U/L / AST: 13 U/L / GGT: x           Urinalysis Basic - ( 06 Jun 2024 07:05 )    Color: x / Appearance: x / SG: x / pH: x  Gluc: 281 mg/dL / Ketone: x  / Bili: x / Urobili: x   Blood: x / Protein: x / Nitrite: x   Leuk Esterase: x / RBC: x / WBC x   Sq Epi: x / Non Sq Epi: x / Bacteria: x        Culture - Abscess with Gram Stain (collected 06-05-24 @ 22:24)  Source: .Abscess perianal  Gram Stain (06-06-24 @ 11:28):    Numerous polymorphonuclear leukocytes seen per low power field    Moderate Gram positive cocci in pairs seen per oil power field    Moderate Gram Negative Rods seen per oil power field    Culture - Blood (collected 06-05-24 @ 17:01)  Source: .Blood Blood-Peripheral  Preliminary Report (06-06-24 @ 23:02):    No growth at 24 hours    Culture - Blood (collected 06-05-24 @ 17:01)  Source: .Blood Blood-Peripheral  Preliminary Report (06-06-24 @ 23:02):    No growth at 24 hours        Lactate, Blood: 1.3 mmol/L (06-06-24 @ 07:05)  Lactate, Blood: 2.3 mmol/L (06-05-24 @ 18:56)  Lactate, Blood: 2.5 mmol/L (06-05-24 @ 17:01)      INFECTIOUS DISEASES TESTING      INFLAMMATORY MARKERS      RADIOLOGY & ADDITIONAL TESTS:  I have personally reviewed the last available Chest xray  CXR      CT  CT Abdomen and Pelvis w/ IV Cont:   ACC: [id number] EXAM:  CT ABDOMEN AND PELVIS IC   ORDERED BY: MEMO ESPARZA     PROCEDURE DATE:  06/05/2024          INTERPRETATION:  CLINICAL HISTORY / REASON FOR EXAM: Painful bump on   buttock. WBC is 12.74    TECHNIQUE: Contiguous axial CT imageswere obtained from the lower chest   to the pubic symphysis following administration of 95 mL Omnipaque 350   intravenous contrast, 5 mL discarded . Oral contrast was not   administered. Coronal and sagittal reformats were submitted for review.    COMPARISON CT: No studies are available for direct comparison.    FINDINGS:    LOWER CHEST/HEART: Unremarkable.    HEPATOBILIARY: No biliary ductal dilatation. No radiopaque gallstones.    SPLEEN: Unremarkable.    PANCREAS: Unremarkable.    ADRENAL GLANDS: Unremarkable.    KIDNEYS: Symmetric renal enhancement. No hydronephrosis.    ABDOMINOPELVIC NODES: No lymphadenopathy by size criteria.    PELVIC ORGANS: Unremarkable.    PERITONEUM/MESENTERY/BOWEL: Postsurgical changes along the greater   curvature the stomach. No pneumoperitoneum. No obstruction or ascites.   Unremarkable appendix.    BONES/SOFT TISSUES: Abscess along the left medial gluteal fold measuring   6.1 x 3.9 cm. Bone island on the left femoral head. Surgical hardware   L5-S1. Mild bilateral gynecomastia.    VASCULAR: Normal caliber abdominal aorta.      IMPRESSION:    Abscess along the left medial gluteal fold measuring 6.1 x 3.9 cm.    --- End of Report ---          SHELL ROSSI MD; Resident Radiologist  This document hasbeen electronically signed.  JESSE HARTLEY MD; Attending Radiologist  This document has been electronically signed. Jun 5 2024  7:59PM (06-05-24 @ 17:28)      CARDIOLOGY TESTING  12 Lead ECG:   Ventricular Rate 100 BPM    Atrial Rate 100 BPM    P-R Interval 136 ms    QRS Duration 86 ms    Q-T Interval 356 ms    QTC Calculation(Bazett) 459 ms    P Axis 71 degrees    R Axis 87 degrees    T Axis -9 degrees    Diagnosis Line Normal sinus rhythm  Nonspecific ST-T changes  Confirmed by ANA PETER MD ([id number]) on 6/6/2024 6:56:17 AM (06-05-24 @ 17:32)      MEDICATIONS  apixaban 5 Oral every 12 hours  atorvastatin 40 Oral at bedtime  cefTRIAXone   IVPB 2000 IV Intermittent every 24 hours  dextrose 10% Bolus 125 IV Bolus once  dextrose 10% Bolus 125 IV Bolus once  dextrose 5%. 1000 IV Continuous <Continuous>  dextrose 5%. 1000 IV Continuous <Continuous>  dextrose 5%. 1000 IV Continuous <Continuous>  dextrose 50% Injectable 25 IV Push once  dextrose 50% Injectable 25 IV Push once  dextrose 50% Injectable 12.5 IV Push once  glucagon  Injectable 1 IntraMuscular once  insulin glargine Injectable (LANTUS) 18 SubCutaneous at bedtime  insulin lispro (ADMELOG) corrective regimen sliding scale  SubCutaneous three times a day before meals  insulin lispro Injectable (ADMELOG) 5 SubCutaneous three times a day before meals  lactated ringers. 1000 IV Continuous <Continuous>  lisinopril 5 Oral daily  metroNIDAZOLE    Tablet 500 Oral every 8 hours  vancomycin  IVPB 1750 IV Intermittent every 12 hours      WEIGHT  Weight (kg): 117.934 (06-05-24 @ 16:45)      ANTIBIOTICS:  cefTRIAXone   IVPB 2000 milliGRAM(s) IV Intermittent every 24 hours  metroNIDAZOLE    Tablet 500 milliGRAM(s) Oral every 8 hours  vancomycin  IVPB 1750 milliGRAM(s) IV Intermittent every 12 hours      All available historical records have been reviewed

## 2024-06-08 NOTE — PROGRESS NOTE ADULT - ASSESSMENT
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 Patient reports he began having pain and felt a bump on his buttock on Sunday. Gradually worsened and got larger. Denies any drainage. Denies fevers or chills at home. Has never had an abscess previously.     #Severe sepsis on admission likely 2/2 abscess (2/4 SIRS + Source + elevated lactate)   #L sided perianal abscess   - s/p I&D with surgery with 30 cc puss drained  - cultures sent - follow up for PO regimen   - c/w Vanc + Ceftriaxone + Flagyl for now     #DM with hyperglycemia   - A1C 11.1 in 3/2024   - Consistent Carbs diet   - Adjust lantus and lispro as needed     #TSH 0  -  Follow up Endocrinology consult     #Herniated discs s/p L5-S1 fusion in 9/2023   - Outpatient PT/OT    #DVT/PE in 3/2024   - c/w Eliquis     #HTN  - Started lisinopril inpatient     #DVT PPx: c/w Eliquis  #Diet: Consistent Carbs  #Activity: IAT  #GI PPx: PPI    38y Male w/ PMHx of DM, herniated discs s/p L5-S1 fusion (9/2023), 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 Patient reports he began having pain and felt a bump on his buttock on Sunday. Gradually worsened and got larger. Denies any drainage. Denies fevers or chills at home. Has never had an abscess previously.     #Severe sepsis on admission likely 2/2 abscess (2/4 SIRS + Source + elevated lactate)   #L sided perianal abscess   - s/p I&D with surgery with 30 cc puss drained  - cultures sent - follow up for PO regimen   - c/w Vanc + Ceftriaxone + Flagyl for now     #DM with hyperglycemia   - A1C 11.1 in 3/2024   - Consistent Carbs diet   - Adjust lantus and lispro as needed   - Follow up Endocrinology consult     #Low TSH  - TSH 0 in March  - Follow up Endocrinology consult     #Herniated discs s/p L5-S1 fusion in 9/2023   - Outpatient PT/OT    #DVT/PE in 3/2024   - c/w Eliquis     #HTN  - Started lisinopril inpatient     #DVT PPx: c/w Eliquis  #Diet: Consistent Carbs  #Activity: IAT  #GI PPx: PPI    no

## 2024-06-08 NOTE — PROGRESS NOTE ADULT - ASSESSMENT
# Perirectal abscess-- s/p I&D-- abscess cx shows gm positive cocci and gm negative rods-- ID agrees for augmentin.    -- patient on cefepime vanco and flagyl here-- E coli-- in cx sensitive to augemtin  # DM uncontrolled Hba1c is 11.2- now 9.7 - started on insulin-- diabetic teaching --seen by endocrine -- metformin and glimepiride recommended  # Hyperthyroid with no symptoms-- TSH was 0 in march 2024--repeat TSH is normal and so is free T4  # hx of DVT/PE-- continue eliquis  #Spinal fusion L5 S1 after a fall-- was  a  now on disability due to fall.    DC plan today--spent more than 30mins.

## 2024-06-08 NOTE — DISCHARGE NOTE NURSING/CASE MANAGEMENT/SOCIAL WORK - NSDCFUADDAPPT_GEN_ALL_CORE_FT
Please call to make an appointment with Endocrinology, surgery, your PCP, and Infectious Disease.    It is important to follow up with Infectious Disease to follow up your wound cultures and see if your antibiotics need to be adjusted.

## 2024-06-10 LAB
CULTURE RESULTS: SIGNIFICANT CHANGE UP
CULTURE RESULTS: SIGNIFICANT CHANGE UP
SPECIMEN SOURCE: SIGNIFICANT CHANGE UP
SPECIMEN SOURCE: SIGNIFICANT CHANGE UP

## 2024-06-11 LAB
CULTURE RESULTS: ABNORMAL
ORGANISM # SPEC MICROSCOPIC CNT: ABNORMAL
ORGANISM # SPEC MICROSCOPIC CNT: SIGNIFICANT CHANGE UP
SPECIMEN SOURCE: SIGNIFICANT CHANGE UP

## 2024-06-13 DIAGNOSIS — Z98.84 BARIATRIC SURGERY STATUS: ICD-10-CM

## 2024-06-13 DIAGNOSIS — Z79.01 LONG TERM (CURRENT) USE OF ANTICOAGULANTS: ICD-10-CM

## 2024-06-13 DIAGNOSIS — R65.20 SEVERE SEPSIS WITHOUT SEPTIC SHOCK: ICD-10-CM

## 2024-06-13 DIAGNOSIS — I10 ESSENTIAL (PRIMARY) HYPERTENSION: ICD-10-CM

## 2024-06-13 DIAGNOSIS — E87.20 ACIDOSIS, UNSPECIFIED: ICD-10-CM

## 2024-06-13 DIAGNOSIS — Z79.84 LONG TERM (CURRENT) USE OF ORAL HYPOGLYCEMIC DRUGS: ICD-10-CM

## 2024-06-13 DIAGNOSIS — A41.9 SEPSIS, UNSPECIFIED ORGANISM: ICD-10-CM

## 2024-06-13 DIAGNOSIS — K61.0 ANAL ABSCESS: ICD-10-CM

## 2024-06-13 DIAGNOSIS — E05.90 THYROTOXICOSIS, UNSPECIFIED WITHOUT THYROTOXIC CRISIS OR STORM: ICD-10-CM

## 2024-06-13 DIAGNOSIS — D72.829 ELEVATED WHITE BLOOD CELL COUNT, UNSPECIFIED: ICD-10-CM

## 2024-06-13 DIAGNOSIS — Z98.1 ARTHRODESIS STATUS: ICD-10-CM

## 2024-06-13 DIAGNOSIS — E11.65 TYPE 2 DIABETES MELLITUS WITH HYPERGLYCEMIA: ICD-10-CM

## 2024-06-13 DIAGNOSIS — E66.9 OBESITY, UNSPECIFIED: ICD-10-CM

## 2024-06-14 LAB — T3 SERPL-MCNC: 86 NG/DL — SIGNIFICANT CHANGE UP

## 2025-03-31 ENCOUNTER — EMERGENCY (EMERGENCY)
Facility: HOSPITAL | Age: 40
LOS: 0 days | Discharge: HOME | End: 2025-03-31
Attending: EMERGENCY MEDICINE
Payer: COMMERCIAL

## 2025-03-31 VITALS
DIASTOLIC BLOOD PRESSURE: 91 MMHG | WEIGHT: 250 LBS | SYSTOLIC BLOOD PRESSURE: 177 MMHG | HEART RATE: 94 BPM | HEIGHT: 77 IN | TEMPERATURE: 98 F | OXYGEN SATURATION: 98 % | RESPIRATION RATE: 18 BRPM

## 2025-03-31 DIAGNOSIS — M79.672 PAIN IN LEFT FOOT: ICD-10-CM

## 2025-03-31 DIAGNOSIS — Z98.890 OTHER SPECIFIED POSTPROCEDURAL STATES: Chronic | ICD-10-CM

## 2025-03-31 DIAGNOSIS — M25.572 PAIN IN LEFT ANKLE AND JOINTS OF LEFT FOOT: ICD-10-CM

## 2025-03-31 DIAGNOSIS — Z79.01 LONG TERM (CURRENT) USE OF ANTICOAGULANTS: ICD-10-CM

## 2025-03-31 DIAGNOSIS — M79.89 OTHER SPECIFIED SOFT TISSUE DISORDERS: ICD-10-CM

## 2025-03-31 DIAGNOSIS — M79.662 PAIN IN LEFT LOWER LEG: ICD-10-CM

## 2025-03-31 DIAGNOSIS — Z86.718 PERSONAL HISTORY OF OTHER VENOUS THROMBOSIS AND EMBOLISM: ICD-10-CM

## 2025-03-31 PROCEDURE — 93970 EXTREMITY STUDY: CPT

## 2025-03-31 PROCEDURE — 99284 EMERGENCY DEPT VISIT MOD MDM: CPT | Mod: 25

## 2025-03-31 PROCEDURE — 93970 EXTREMITY STUDY: CPT | Mod: 26

## 2025-03-31 PROCEDURE — 99284 EMERGENCY DEPT VISIT MOD MDM: CPT

## 2025-03-31 NOTE — ED PROVIDER NOTE - OBJECTIVE STATEMENT
Patient is a 39 year old male with pmhx of L5-S1 fusion, dvt/pe formerly on eliquis (discontinued march 1st, 2025) presents for left foot and ankle swelling with left calf and leg pain similar to prior visit which dvt was diagnosed. He denies any fever, cough, sore throat, N/V, chest pain, shortness of breath, abdominal pain, or urinary complaints.

## 2025-03-31 NOTE — ED ADULT NURSE NOTE - OBJECTIVE STATEMENT
Pt A+Ox4, amb with steady gait..  c/o calf pain with swelling to foot. Has hx of DVT, no longer taking eliquis.

## 2025-03-31 NOTE — ED PROVIDER NOTE - PATIENT PORTAL LINK FT
You can access the FollowMyHealth Patient Portal offered by [facility] by registering at the following website: [email or web address]. By joining [facility]’s FollowMyHealth portal, you will also be able to view your health information using other applications (apps) compatible with our system.

## 2025-03-31 NOTE — ED PROVIDER NOTE - NSFOLLOWUPINSTRUCTIONS_ED_ALL_ED_FT
Follow up with your Vascular Surgeon and a Hematologist    Our Emergency Department Referral Coordinators will be reaching out to you in the next 24-48 hours from 9:00am to 5:00pm with a follow up appointment. Please expect a phone call from the hospital in that time frame. If you do not receive a call or if you have any questions or concerns, you can reach them at   [phone]    Deep Vein Thrombosis    A deep vein thrombosis (DVT) is a blood clot (thrombus) that usually occurs in a deep, larger vein of the lower leg or the pelvis, or in an upper extremity such as the arm. These are dangerous and can lead to serious and even life-threatening complications if the clot travels to the lungs.    A DVT can damage the valves in your leg veins so that instead of flowing upward, the blood pools in the lower leg. This is called post-thrombotic syndrome, and it can result in pain, swelling, discoloration, and sores on the leg.     CAUSES  A DVT is caused by the formation of a blood clot in your leg, pelvis, or arm. Usually, several things contribute to the formation of blood clots. A clot may develop when:    Your blood flow slows down.  Your vein becomes damaged in some way.  You have a condition that makes your blood clot more easily.    RISK FACTORS  A DVT is more likely to develop in:    People who are older, especially over 60 years of age.  People who are overweight (obese).  People who sit or lie still for a long time, such as during long-distance travel (over 4 hours), bed rest, hospitalization, or during recovery from certain medical conditions like a stroke.  People who do not engage in much physical activity (sedentary lifestyle).  People who have chronic breathing disorders.  People who have a personal or family history of blood clots or blood clotting disease.  People who have peripheral vascular disease (PVD), diabetes, or some types of cancer.  People who have heart disease, especially if the person had a recent heart attack or has congestive heart failure.  People who have neurological diseases that affect the legs (leg paresis).  People who have had a traumatic injury, such as breaking a hip or leg.  People who have recently had major or lengthy surgery, especially on the hip, knee, or abdomen.  People who have had a central line placed inside a large vein.  People who take medicines that contain the hormone estrogen. These include birth control pills and hormone replacement therapy.  Pregnancy or during childbirth or the postpartum period.   Long plane flights (over 8 hours).    SIGNS AND SYMPTOMS  Symptoms of a DVT can include:     Swelling of your leg or arm, especially if one side is much worse.  Warmth and redness of your leg or arm, especially if one side is much worse.  Pain in your arm or leg. If the clot is in your leg, symptoms may be more noticeable or worse when you stand or walk.  A feeling of pins and needles, if the clot is in the arm.    The symptoms of a DVT that has traveled to the lungs (pulmonary embolism, PE) usually start suddenly and include:    Shortness of breath while active or at rest.  Coughing or coughing up blood or blood-tinged mucus.  Chest pain that is often worse with deep breaths.  Rapid or irregular heartbeat.  Feeling light-headed or dizzy.  Fainting.  Feeling anxious.  Sweating.    There may also be pain and swelling in a leg if that is where the blood clot started.    These symptoms may represent a serious problem that is an emergency. Do not wait to see if the symptoms will go away. Get medical help right away. Call your local emergency services (911 in the [place] Do not drive yourself to the hospital.    DIAGNOSIS  Your health care provider will take a medical history and perform a physical exam. You may also have other tests, including:    Blood tests to assess the clotting properties of your blood.  Imaging tests, such as CT, ultrasound, MRI, X-ray, and other tests to see if you have clots anywhere in your body.    TREATMENT  After a DVT is identified, it can be treated. The type of treatment that you receive depends on many factors, such as the cause of your DVT, your risk for bleeding or developing more clots, and other medical conditions that you have. Sometimes, a combination of treatments is necessary. Treatment options may be combined and include:    Monitoring the blood clot with ultrasound.  Taking medicines by mouth, such as newer blood thinners (anticoagulants), thrombolytics, or warfarin.  Taking anticoagulant medicine by injection or through an IV tube.  Wearing compression stockings or using different types of devices.  Surgery (rare) to remove the blood clot or to place a filter in your abdomen to stop the blood clot from traveling to your lungs.    Treatments for a DVT are often divided into immediate treatment and long-term treatment (up to 3 months after DVT). You can work with your health care provider to choose the treatment program that is best for you.    HOME CARE INSTRUCTIONS  If you are taking a newer oral anticoagulant:    Take the medicine every single day at the same time each day.  Understand what foods and drugs interact with this medicine.  Understand that there are no regular blood tests required when using this medicine.  Understand the side effects of this medicine, including excessive bruising or bleeding. Ask your health care provider or pharmacist about other possible side effects.    If you are taking warfarin:    Understand how to take warfarin and know which foods can affect how warfarin works in your body.   Understand that it is dangerous to take too much or too little warfarin. Too much warfarin increases the risk of bleeding. Too little warfarin continues to allow the risk for blood clots.  Follow your PT and INR blood testing schedule. The PT and INR results allow your health care provider to adjust your dose of warfarin. It is very important that you have your PT and INR tested as often as told by your health care provider.  Avoid major changes in your diet, or tell your health care provider before you change your diet. Arrange a visit with a registered dietitian to answer your questions. Many foods, especially foods that are high in vitamin K, can interfere with warfarin and affect the PT and INR results. Eat a consistent amount of foods that are high in vitamin K, such as:  Spinach, kale, broccoli, cabbage, lynda greens, turnip greens, Holton sprouts, peas, cauliflower, seaweed, and parsley.  Beef liver and pork liver.  Green tea.  Soybean oil.  Tell your health care provider about any and all medicines, vitamins, and supplements that you take, including aspirin and other over-the-counter anti-inflammatory medicines. Be especially cautious with aspirin and anti-inflammatory medicines. Do not take those before you ask your health care provider if it is safe to do so. This is important because many medicines can interfere with warfarin and affect the PT and INR results.  Do not start or stop taking any over-the-counter or prescription medicine unless your health care provider or pharmacist tells you to do so.    If you take warfarin, you will also need to do these things:    Hold pressure over cuts for longer than usual.   Tell your dentist and other health care providers that you are taking warfarin before you have any procedures in which bleeding may occur.  Avoid alcohol or drink very small amounts. Tell your health care provider if you change your alcohol intake.  Do not use tobacco products, including cigarettes, chewing tobacco, and e-cigarettes. If you need help quitting, ask your health care provider.  Avoid contact sports.    General Instructions    Take over-the-counter and prescription medicines only as told by your health care provider. Anticoagulant medicines can have side effects, including easy bruising and difficulty stopping bleeding. If you are prescribed an anticoagulant, you will also need to do these things:  Hold pressure over cuts for longer than usual.  Tell your dentist and other health care providers that you are taking anticoagulants before you have any procedures in which bleeding may occur.   Avoid contact sports.   Wear a medical alert bracelet or carry a medical alert card that says you have had a PE.  Ask your health care provider how soon you can go back to your normal activities. Stay active to prevent new blood clots from forming.  Make sure to exercise while traveling or when you have been sitting or standing for a long period of time. It is very important to exercise. Exercise your legs by walking or by tightening and relaxing your leg muscles often. Take frequent walks.  Wear compression stockings as told by your health care provider to help prevent more blood clots from forming.  Do not use tobacco products, including cigarettes, chewing tobacco, and e-cigarettes. If you need help quitting, ask your health care provider.  Keep all follow-up appointments with your health care provider. This is important.    PREVENTION  Take these actions to decrease your risk of developing another DVT:    Exercise regularly. For at least 30 minutes every day, engage in:  Activity that involves moving your arms and legs.  Activity that encourages good blood flow through your body by increasing your heart rate.  Exercise your arms and legs every hour during long-distance travel (over 4 hours). Drink plenty of water and avoid drinking alcohol while traveling.  Avoid sitting or lying in bed for long periods of time without moving your legs.  Maintain a weight that is appropriate for your height. Ask your health care provider what weight is healthy for you.  If you are a woman who is over 35 years of age, avoid unnecessary use of medicines that contain estrogen. These include birth control pills.  Do not smoke, especially if you take estrogen medicines. If you need help quitting, ask your health care provider.    If you are hospitalized, prevention measures may include:    Early walking after surgery, as soon as your health care provider says that it is safe.  Receiving anticoagulants to prevent blood clots. If you cannot take anticoagulants, other options may be available, such as wearing compression stockings or using different types of devices.    SEEK IMMEDIATE MEDICAL CARE IF:  You have new or increased pain, swelling, or redness in an arm or leg.  You have numbness or tingling in an arm or leg.  You have shortness of breath while active or at rest.  You have chest pain.  You have a rapid or irregular heartbeat.  You feel light-headed or dizzy.  You cough up blood.  You notice blood in your vomit, bowel movement, or urine.    These symptoms may represent a serious problem that is an emergency. Do not wait to see if the symptoms will go away. Get medical help right away. Call your local emergency services (911 in the [place] Do not drive yourself to the hospital.

## 2025-03-31 NOTE — ED ADULT TRIAGE NOTE - SPO2 (%)
Additional History: Patient reports new red lesion that she doesn’t think is related to her “rash”, but states since she has had multiple skin cancers and pre cancers she would like lesion biopsied if provider deems necessary. 98

## 2025-03-31 NOTE — ED PROVIDER NOTE - PHYSICAL EXAMINATION
As Follows:  CONST: Well appearing in NAD  EYES: PERRL, EOMI, Sclera and conjunctiva clear.   CARD: No murmurs, rubs, or gallops; Normal rate and rhythm  RESP: BS Equal B/L, No wheezes, rhonchi or rales. No distress or accessory breathing  MS: Left ankle edema > right. LLE tenderness. Normal ROM in all extremities. No midline Cervical/Thoracic/Lumbar spinal tenderness.  SKIN: Warm, dry, no acute rashes. MMM  NEURO: Alert and Oriented, No focal deficits. Strength and sensation intact. Steady gait

## 2025-03-31 NOTE — ED PROVIDER NOTE - CLINICAL SUMMARY MEDICAL DECISION MAKING FREE TEXT BOX
Agree with above history exam.  Patient here with left ankle/calf pain and swelling found to have a chronic DVT outpatient vascular follow-up advised.

## 2025-04-01 ENCOUNTER — TRANSCRIPTION ENCOUNTER (OUTPATIENT)
Age: 40
End: 2025-04-01